# Patient Record
Sex: FEMALE | Race: BLACK OR AFRICAN AMERICAN | NOT HISPANIC OR LATINO | Employment: UNEMPLOYED | URBAN - METROPOLITAN AREA
[De-identification: names, ages, dates, MRNs, and addresses within clinical notes are randomized per-mention and may not be internally consistent; named-entity substitution may affect disease eponyms.]

---

## 2022-09-04 ENCOUNTER — HOSPITAL ENCOUNTER (EMERGENCY)
Facility: HOSPITAL | Age: 22
Discharge: HOME | End: 2022-09-04
Attending: EMERGENCY MEDICINE

## 2022-09-04 ENCOUNTER — APPOINTMENT (OUTPATIENT)
Dept: RADIOLOGY | Facility: HOSPITAL | Age: 22
End: 2022-09-04

## 2022-09-04 VITALS
SYSTOLIC BLOOD PRESSURE: 122 MMHG | HEART RATE: 86 BPM | WEIGHT: 124.12 LBS | OXYGEN SATURATION: 100 % | DIASTOLIC BLOOD PRESSURE: 69 MMHG | TEMPERATURE: 98.8 F | RESPIRATION RATE: 16 BRPM

## 2022-09-04 DIAGNOSIS — R07.89 MUSCULOSKELETAL CHEST PAIN: Primary | ICD-10-CM

## 2022-09-04 DIAGNOSIS — M94.0 COSTOCHONDRITIS: ICD-10-CM

## 2022-09-04 LAB
ATRIAL RATE: 80 BPM
EXT PREG TEST URINE: NEGATIVE
EXT. CONTROL ED NAV: NORMAL
P AXIS: 72 DEGREES
PR INTERVAL: 164 MS
QRS AXIS: 71 DEGREES
QRSD INTERVAL: 98 MS
QT INTERVAL: 376 MS
QTC INTERVAL: 433 MS
T WAVE AXIS: 65 DEGREES
VENTRICULAR RATE: 80 BPM

## 2022-09-04 PROCEDURE — 81025 URINE PREGNANCY TEST: CPT | Performed by: EMERGENCY MEDICINE

## 2022-09-04 PROCEDURE — 71046 X-RAY EXAM CHEST 2 VIEWS: CPT

## 2022-09-04 PROCEDURE — 93005 ELECTROCARDIOGRAM TRACING: CPT

## 2022-09-04 PROCEDURE — 99284 EMERGENCY DEPT VISIT MOD MDM: CPT

## 2022-09-04 PROCEDURE — 93010 ELECTROCARDIOGRAM REPORT: CPT

## 2022-09-04 PROCEDURE — 99284 EMERGENCY DEPT VISIT MOD MDM: CPT | Performed by: EMERGENCY MEDICINE

## 2022-09-04 RX ORDER — ACETAMINOPHEN 325 MG/1
650 TABLET ORAL ONCE
Status: COMPLETED | OUTPATIENT
Start: 2022-09-04 | End: 2022-09-04

## 2022-09-04 RX ORDER — NAPROXEN 500 MG/1
500 TABLET ORAL 2 TIMES DAILY WITH MEALS
Qty: 30 TABLET | Refills: 0 | Status: SHIPPED | OUTPATIENT
Start: 2022-09-04 | End: 2022-10-27

## 2022-09-04 RX ADMIN — ACETAMINOPHEN 650 MG: 325 TABLET, FILM COATED ORAL at 21:02

## 2022-09-04 NOTE — Clinical Note
Leesa Quinonez was seen and treated in our emergency department on 9/4/2022  Diagnosis:     Jeannie Holley  may return to work on return date  She may return on this date: 09/05/2022    Limited lifting until pain improves     If you have any questions or concerns, please don't hesitate to call        Iwona Anand MD    ______________________________           _______________          _______________  Hospital Representative                              Date                                Time

## 2022-09-05 NOTE — ED PROVIDER NOTES
History  Chief Complaint   Patient presents with    Muscle Pain     Chest muscle pain r/t heavy lifting, took aleve for the same  63-year-old healthy female presenting for evaluation of chest pain  Patient states she was lifting heavy boxes at work and felt a pop in her midsternal region  Since that time, patient has had a constant sharp pain that is worse with movement and deep breathing  Patient took Aleve at home without significant improvement  Patient denies nausea, vomiting, dyspnea, diaphoresis, fever, chills, recent upper respiratory infection  Denies personal or family history of blood clot, leg swelling, recent travel, surgery, immobility, or exogenous estrogen use, hemoptysis  None       History reviewed  No pertinent past medical history  History reviewed  No pertinent surgical history  History reviewed  No pertinent family history  I have reviewed and agree with the history as documented  E-Cigarette/Vaping    E-Cigarette Use Never User      E-Cigarette/Vaping Substances     Social History     Tobacco Use    Smoking status: Never Smoker    Smokeless tobacco: Never Used   Vaping Use    Vaping Use: Never used   Substance Use Topics    Alcohol use: Never    Drug use: Never       Review of Systems   Constitutional: Negative for chills and fever  HENT: Negative for congestion, rhinorrhea and sore throat  Eyes: Negative for pain, discharge and visual disturbance  Respiratory: Negative for cough and shortness of breath  Cardiovascular: Positive for chest pain  Negative for palpitations and leg swelling  Gastrointestinal: Negative for abdominal pain, diarrhea, nausea and vomiting  Genitourinary: Negative for dysuria, frequency and hematuria  Musculoskeletal: Negative for arthralgias and myalgias  Skin: Negative for color change and rash  Neurological: Negative for dizziness, weakness, light-headedness, numbness and headaches     Hematological: Does not bruise/bleed easily  Physical Exam  Physical Exam  Vitals and nursing note reviewed  Constitutional:       General: She is not in acute distress  Appearance: Normal appearance  HENT:      Head: Normocephalic and atraumatic  Nose: Nose normal       Mouth/Throat:      Mouth: Mucous membranes are moist    Eyes:      General: No scleral icterus  Extraocular Movements: Extraocular movements intact  Conjunctiva/sclera: Conjunctivae normal       Pupils: Pupils are equal, round, and reactive to light  Cardiovascular:      Rate and Rhythm: Normal rate and regular rhythm  Pulmonary:      Effort: Pulmonary effort is normal  No respiratory distress  Breath sounds: No wheezing, rhonchi or rales  Chest:      Chest wall: Tenderness (Midsternal to palpation) present  Abdominal:      General: There is no distension  Palpations: Abdomen is soft  Tenderness: There is no abdominal tenderness  There is no guarding or rebound  Musculoskeletal:         General: No deformity  Cervical back: Neck supple  Skin:     General: Skin is warm and dry  Findings: No rash  Neurological:      General: No focal deficit present  Mental Status: She is alert and oriented to person, place, and time  Mental status is at baseline        Gait: Gait normal    Psychiatric:         Mood and Affect: Mood normal          Behavior: Behavior normal          Vital Signs  ED Triage Vitals   Temperature Pulse Respirations Blood Pressure SpO2   09/04/22 2028 09/04/22 2028 09/04/22 2028 09/04/22 2028 09/04/22 2028   98 8 °F (37 1 °C) 86 16 122/69 100 %      Temp Source Heart Rate Source Patient Position - Orthostatic VS BP Location FiO2 (%)   09/04/22 2028 09/04/22 2028 09/04/22 2028 09/04/22 2028 --   Tympanic Monitor Sitting Left arm       Pain Score       09/04/22 2102       10 - Worst Possible Pain           Vitals:    09/04/22 2028   BP: 122/69   Pulse: 86   Patient Position - Orthostatic VS: Sitting         Visual Acuity      ED Medications  Medications   acetaminophen (TYLENOL) tablet 650 mg (650 mg Oral Given 9/4/22 2102)       Diagnostic Studies  Results Reviewed     Procedure Component Value Units Date/Time    POCT pregnancy, urine [[id number]]  (Normal) Resulted: 09/04/22 2101    Lab Status: Final result Updated: 09/04/22 2102     EXT PREG TEST UR (Ref: Negative) negative     Control valid    POCT pregnancy, urine [[id number]]     Lab Status: No result                  XR chest 2 views   ED Interpretation by Rg Riley MD (09/04 2118)   No acute disease                 Procedures  ECG 12 Lead Documentation Only    Date/Time: 9/4/2022 11:15 PM  Performed by: Rg Riley MD  Authorized by: Rg Riley MD     Indications / Diagnosis:  Chest pain  ECG reviewed by me, the ED Provider: yes    Patient location:  ED  Previous ECG:     Previous ECG:  Unavailable  Interpretation:     Interpretation: normal    Rate:     ECG rate:  80    ECG rate assessment: normal    Rhythm:     Rhythm: sinus rhythm    Ectopy:     Ectopy: none    QRS:     QRS axis:  Normal    QRS intervals:  Normal  Conduction:     Conduction: abnormal      Abnormal conduction: non-specific intraventricular conduction delay    ST segments:     ST segments:  Normal  T waves:     T waves: normal    Comments:      No comparison; possible right ventricular conduction delay             ED Course  ED Course as of 09/04/22 2317   Sun Sep 04, 2022   2102 PREGNANCY TEST URINE: negative                       PERC Rule for PE    Flowsheet Row Most Recent Value   PERC Rule for PE    Age >=50 0 Filed at: 09/04/2022 2044   HR >=100 0 Filed at: 09/04/2022 2044   O2 Sat on room air < 95% 0 Filed at: 09/04/2022 2044   History of PE or DVT 0 Filed at: 09/04/2022 2044   Recent trauma or surgery 0 Filed at: 09/04/2022 2044   Hemoptysis 0 Filed at: 09/04/2022 2044   Exogenous estrogen 0 Filed at: 09/04/2022 2044 Unilateral leg swelling 0 Filed at: 09/04/2022 2044   PERC Rule for PE Results 0 Filed at: 09/04/2022 2044                            [facility]  Number of Diagnoses or Management Options  Costochondritis  Musculoskeletal chest pain  Diagnosis management comments: 79-year-old female presenting for evaluation of chest pain after lifting heavy object at work today  Pain is reproducible without crepitus  EKG shows normal sinus rhythm without evidence of ischemia or malignant dysrhythmia  Chest x-ray per my interpretation is negative for acute pathology  Patient is PERC negative; low suspicion for pulmonary embolism as a cause of her symptoms  Given her history of physical examination, I suspect musculoskeletal etiology of possible costochondritis  Patient given acetaminophen here and counseled on supportive measures at home  Will provide prescription for naproxen  Provided with limited return to work ability until pain is improved  Will follow-up with PCP as needed  Return precautions discussed  Patient is in agreement and understanding of these instructions  Disposition  Final diagnoses:   Musculoskeletal chest pain   Costochondritis     Time reflects when diagnosis was documented in both MDM as applicable and the Disposition within this note     Time User Action Codes Description Comment    9/4/2022  9:19 PM Zack Sarmiento Add [R07 89] Musculoskeletal chest pain     9/4/2022  9:19 PM Zack Sarmiento Add [M94 0] Costochondritis       ED Disposition     ED Disposition   Discharge    Condition   Stable    Date/Time   Sun Sep 4, 2022  9:19 PM    Comment   Jose Eye discharge to home/self care                 Follow-up Information     Follow up With Specialties Details Why [address] [facility]  As needed [address] [place] [address]  [phone]            Discharge Medication List as of 9/4/2022  9:21 PM      START taking these medications    Details   naproxen (Naprosyn) 500 mg tablet Take 1 tablet (500 mg total) by mouth 2 (two) times a day with meals, Starting Sun 9/4/2022, Print             No discharge procedures on file      PDMP Review     None          ED Provider  Electronically Signed by           Eugene Breaux MD  09/04/22 8811

## 2022-10-27 ENCOUNTER — HOSPITAL ENCOUNTER (EMERGENCY)
Facility: HOSPITAL | Age: 22
Discharge: HOME | End: 2022-10-27
Attending: EMERGENCY MEDICINE

## 2022-10-27 ENCOUNTER — APPOINTMENT (EMERGENCY)
Dept: RADIOLOGY | Facility: HOSPITAL | Age: 22
End: 2022-10-27

## 2022-10-27 VITALS
DIASTOLIC BLOOD PRESSURE: 87 MMHG | SYSTOLIC BLOOD PRESSURE: 133 MMHG | RESPIRATION RATE: 18 BRPM | OXYGEN SATURATION: 99 % | BODY MASS INDEX: 18.99 KG/M2 | WEIGHT: 121 LBS | TEMPERATURE: 98.4 F | HEART RATE: 77 BPM | HEIGHT: 67 IN

## 2022-10-27 DIAGNOSIS — S93.402A SPRAIN OF LEFT ANKLE, UNSPECIFIED LIGAMENT, INITIAL ENCOUNTER: Primary | ICD-10-CM

## 2022-10-27 PROCEDURE — 73610 X-RAY EXAM OF ANKLE: CPT

## 2022-10-27 RX ORDER — KETOROLAC TROMETHAMINE 30 MG/ML
15 INJECTION, SOLUTION INTRAMUSCULAR; INTRAVENOUS ONCE
Status: COMPLETED | OUTPATIENT
Start: 2022-10-27 | End: 2022-10-27

## 2022-10-27 RX ADMIN — KETOROLAC TROMETHAMINE 15 MG: 30 INJECTION, SOLUTION INTRAMUSCULAR; INTRAVENOUS at 18:17

## 2022-10-27 NOTE — ED PROVIDER NOTES
History  Chief Complaint   Patient presents with   • Ankle Pain     Left ankle injury 1 month ago, past two days left ankle pain and swelling  No meds  HPI  Patient is a 82-year-old female with no significant past medical history presenting with left ankle injury  Patient reports that 1 month ago was walking down a ladder when she slipped causing an internal rotation type injury to her left ankle  Reports Munoz pain for several days following that but was quickly back to her usual self air with no residual pain  Two days ago she again had a similar inversion type injury to the left ankle and has noticed increasing pain and swelling to the lateral aspect of the ankle since then  He denies any numbness or weakness  Denies any other injuries no hit to head denies loss consciousness  Has not been taking anything for pain  None       History reviewed  No pertinent past medical history  History reviewed  No pertinent surgical history  History reviewed  No pertinent family history  I have reviewed and agree with the history as documented  E-Cigarette/Vaping   • E-Cigarette Use Never User      E-Cigarette/Vaping Substances     Social History     Tobacco Use   • Smoking status: Never Smoker   • Smokeless tobacco: Never Used   Vaping Use   • Vaping Use: Never used   Substance Use Topics   • Alcohol use: Never   • Drug use: Never       Review of Systems   Constitutional: Negative for chills and fever  HENT: Negative for congestion and sore throat  Eyes: Negative for redness and visual disturbance  Respiratory: Negative for cough and shortness of breath  Cardiovascular: Negative for chest pain and palpitations  Gastrointestinal: Negative for constipation, diarrhea, nausea and vomiting  Genitourinary: Negative for dysuria, hematuria, vaginal bleeding and vaginal discharge  Musculoskeletal: Positive for arthralgias (L ankle)  Negative for myalgias  Skin: Negative for rash and wound  Allergic/Immunologic: Negative for immunocompromised state  Neurological: Negative for seizures and syncope  Psychiatric/Behavioral: Negative for confusion, self-injury and suicidal ideas  Physical Exam  Physical Exam  Vitals and nursing note reviewed  Constitutional:       General: She is not in acute distress  Appearance: Normal appearance  She is well-developed  She is not ill-appearing  HENT:      Head: Normocephalic and atraumatic  No raccoon eyes  Right Ear: External ear normal       Left Ear: External ear normal       Nose: Nose normal  No congestion  Mouth/Throat:      Lips: Pink  Mouth: Mucous membranes are moist    Eyes:      General: Lids are normal  No scleral icterus  Conjunctiva/sclera: Conjunctivae normal    Cardiovascular:      Rate and Rhythm: Normal rate and regular rhythm  Heart sounds: No murmur heard  No friction rub  Pulmonary:      Effort: Pulmonary effort is normal  No respiratory distress  Breath sounds: No wheezing or rales  Abdominal:      General: Abdomen is flat  Tenderness: There is no abdominal tenderness  There is no guarding or rebound  Musculoskeletal:         General: No swelling or signs of injury  Cervical back: Normal range of motion  No rigidity or tenderness  Comments: Slight left ankle ecchymosis and edema over the lateral malleolus with tenderness palpation along the lateral malleolus into the base of the 5th metatarsal   No medial findings sensation intact DP is 2+ bilaterally  Able to ambulate  Skin:     General: Skin is warm and dry  Coloration: Skin is not jaundiced  Findings: No rash  Neurological:      Mental Status: She is alert and oriented to person, place, and time  Mental status is at baseline  Psychiatric:         Behavior: Behavior normal  Behavior is cooperative           Vital Signs  ED Triage Vitals [10/27/22 1735]   Temperature Pulse Respirations Blood Pressure SpO2 98 2 °F (36 8 °C) 91 17 122/71 99 %      Temp Source Heart Rate Source Patient Position - Orthostatic VS BP Location FiO2 (%)   Oral Monitor Sitting Left arm --      Pain Score       7           Vitals:    10/27/22 1735   BP: 122/71   Pulse: 91   Patient Position - Orthostatic VS: Sitting         Visual Acuity      ED Medications  Medications   ketorolac (TORADOL) injection 15 mg (has no administration in time range)       Diagnostic Studies  Results Reviewed     None                 XR ankle 3+ views LEFT    (Results Pending)              Procedures  Procedures         ED Course  ED Course as of 10/27/22 2008   Thu Oct 27, 2022   Joanne 1976 Imaging review done by myself and preliminary results were discussed with the patient and family members  Whitney Longoria was had with patient and family members that this read is preliminary and therefore discrepancies between this read and the final read by the radiologist are possible   Patient and family members were informed that any discrepancies found by would be relayed by phone call          imaging reassuring  Placed in an Aircast and given advised for supportive care including RI CE therapy  will discharge in stable condition will follow-up with PCP usual extremity injury return precautions discussed  MDM  Patient on arrival is ambulatory to room is in no acute distress, vital signs stable, afebrile  On exam lungs clear auscultation, heart without murmurs rubs or gallops abdomen soft nontender  Lateral malleolus tenderness on exam   Will obtain imaging to further assess will give Toradol for pain  Disposition  Final diagnoses:   None     ED Disposition     None      Follow-up Information    None         Patient's Medications   Discharge Prescriptions    No medications on file       No discharge procedures on file      PDMP Review     None          ED Provider  Electronically Signed by           Av Mcgovern MD  10/27/22 2008

## 2022-10-27 NOTE — DISCHARGE INSTRUCTIONS
You have been evaluated in the Emergency Department today for L ankle pain  Your evaluation did not find evidence of medical conditions requiring emergent intervention at this time  Please rest, ice, and elevate your L ankle, and resume normal activities as tolerated  We recommend you take 600mg ibuprofen every 6 hours or tylenol 650mg every 6 hours as needed for pain  If needed, you can alternate these medications so that you take one medication every 3 hours  For instance, at noon take ibuprofen, then at 3pm take tylenol, then at 6pm take ibuprofen  Please schedule an appointment for follow up with your primary care physician this week  Return to the Emergency Department if you experience worsening pain, numbness, tingling, change of color in your toes, or any other concerning symptoms  Thank you for choosing us for your care

## 2023-10-15 ENCOUNTER — APPOINTMENT (EMERGENCY)
Dept: CT IMAGING | Facility: HOSPITAL | Age: 23
End: 2023-10-15
Payer: COMMERCIAL

## 2023-10-15 ENCOUNTER — HOSPITAL ENCOUNTER (EMERGENCY)
Facility: HOSPITAL | Age: 23
Discharge: HOME | End: 2023-10-15
Attending: EMERGENCY MEDICINE
Payer: COMMERCIAL

## 2023-10-15 VITALS
OXYGEN SATURATION: 100 % | HEIGHT: 67 IN | WEIGHT: 125 LBS | BODY MASS INDEX: 19.62 KG/M2 | HEART RATE: 93 BPM | SYSTOLIC BLOOD PRESSURE: 138 MMHG | DIASTOLIC BLOOD PRESSURE: 77 MMHG | TEMPERATURE: 98.8 F | RESPIRATION RATE: 19 BRPM

## 2023-10-15 DIAGNOSIS — S09.90XA INJURY OF HEAD, INITIAL ENCOUNTER: ICD-10-CM

## 2023-10-15 DIAGNOSIS — R55 SYNCOPE: Primary | ICD-10-CM

## 2023-10-15 DIAGNOSIS — D50.9 IRON DEFICIENCY ANEMIA: ICD-10-CM

## 2023-10-15 DIAGNOSIS — R55 VASOVAGAL SYNCOPE: ICD-10-CM

## 2023-10-15 LAB
ANION GAP SERPL CALCULATED.3IONS-SCNC: 6 MMOL/L
BASOPHILS # BLD AUTO: 0.01 THOUSANDS/ÂΜL (ref 0–0.1)
BASOPHILS NFR BLD AUTO: 0 % (ref 0–1)
BUN SERPL-MCNC: 11 MG/DL (ref 5–25)
CALCIUM SERPL-MCNC: 9.4 MG/DL (ref 8.4–10.2)
CHLORIDE SERPL-SCNC: 104 MMOL/L (ref 96–108)
CO2 SERPL-SCNC: 27 MMOL/L (ref 21–32)
CREAT SERPL-MCNC: 0.71 MG/DL (ref 0.6–1.3)
EOSINOPHIL # BLD AUTO: 0.02 THOUSAND/ÂΜL (ref 0–0.61)
EOSINOPHIL NFR BLD AUTO: 0 % (ref 0–6)
ERYTHROCYTE [DISTWIDTH] IN BLOOD BY AUTOMATED COUNT: 15.5 % (ref 11.6–15.1)
GFR SERPL CREATININE-BSD FRML MDRD: 122 ML/MIN/1.73SQ M
GLUCOSE SERPL-MCNC: 90 MG/DL (ref 65–140)
HCG SERPL QL: NEGATIVE
HCT VFR BLD AUTO: 32.7 % (ref 34.8–46.1)
HGB BLD-MCNC: 10.3 G/DL (ref 11.5–15.4)
IMM GRANULOCYTES # BLD AUTO: 0 THOUSAND/UL (ref 0–0.2)
IMM GRANULOCYTES NFR BLD AUTO: 0 % (ref 0–2)
LYMPHOCYTES # BLD AUTO: 1.39 THOUSANDS/ÂΜL (ref 0.6–4.47)
LYMPHOCYTES NFR BLD AUTO: 25 % (ref 14–44)
MAGNESIUM SERPL-MCNC: 2 MG/DL (ref 1.9–2.7)
MCH RBC QN AUTO: 24.9 PG (ref 26.8–34.3)
MCHC RBC AUTO-ENTMCNC: 31.5 G/DL (ref 31.4–37.4)
MCV RBC AUTO: 79 FL (ref 82–98)
MONOCYTES # BLD AUTO: 0.61 THOUSAND/ÂΜL (ref 0.17–1.22)
MONOCYTES NFR BLD AUTO: 11 % (ref 4–12)
NEUTROPHILS # BLD AUTO: 3.59 THOUSANDS/ÂΜL (ref 1.85–7.62)
NEUTS SEG NFR BLD AUTO: 64 % (ref 43–75)
NRBC BLD AUTO-RTO: 0 /100 WBCS
PLATELET # BLD AUTO: 330 THOUSANDS/UL (ref 149–390)
PMV BLD AUTO: 11.4 FL (ref 8.9–12.7)
POTASSIUM SERPL-SCNC: 3.6 MMOL/L (ref 3.5–5.3)
RBC # BLD AUTO: 4.14 MILLION/UL (ref 3.81–5.12)
SODIUM SERPL-SCNC: 137 MMOL/L (ref 135–147)
WBC # BLD AUTO: 5.62 THOUSAND/UL (ref 4.31–10.16)

## 2023-10-15 PROCEDURE — G1004 CDSM NDSC: HCPCS

## 2023-10-15 PROCEDURE — 70450 CT HEAD/BRAIN W/O DYE: CPT

## 2023-10-15 PROCEDURE — 83735 ASSAY OF MAGNESIUM: CPT | Performed by: EMERGENCY MEDICINE

## 2023-10-15 PROCEDURE — 93005 ELECTROCARDIOGRAM TRACING: CPT

## 2023-10-15 PROCEDURE — 36415 COLL VENOUS BLD VENIPUNCTURE: CPT | Performed by: EMERGENCY MEDICINE

## 2023-10-15 PROCEDURE — 84703 CHORIONIC GONADOTROPIN ASSAY: CPT | Performed by: EMERGENCY MEDICINE

## 2023-10-15 PROCEDURE — 85025 COMPLETE CBC W/AUTO DIFF WBC: CPT | Performed by: EMERGENCY MEDICINE

## 2023-10-15 PROCEDURE — 80048 BASIC METABOLIC PNL TOTAL CA: CPT | Performed by: EMERGENCY MEDICINE

## 2023-10-15 RX ORDER — FERROUS SULFATE 325(65) MG
325 TABLET ORAL DAILY
Qty: 30 TABLET | Refills: 0 | Status: SHIPPED | OUTPATIENT
Start: 2023-10-15

## 2023-10-15 RX ORDER — ACETAMINOPHEN 325 MG/1
650 TABLET ORAL ONCE
Status: COMPLETED | OUTPATIENT
Start: 2023-10-15 | End: 2023-10-15

## 2023-10-15 RX ORDER — FERROUS GLUCONATE 324(38)MG
324 TABLET ORAL ONCE
Status: COMPLETED | OUTPATIENT
Start: 2023-10-15 | End: 2023-10-15

## 2023-10-15 RX ORDER — KETOROLAC TROMETHAMINE 30 MG/ML
30 INJECTION, SOLUTION INTRAMUSCULAR; INTRAVENOUS ONCE
Status: COMPLETED | OUTPATIENT
Start: 2023-10-15 | End: 2023-10-15

## 2023-10-15 RX ADMIN — KETOROLAC TROMETHAMINE 30 MG: 30 INJECTION, SOLUTION INTRAMUSCULAR; INTRAVENOUS at 21:40

## 2023-10-15 RX ADMIN — FERROUS GLUCONATE 324 MG: 324 TABLET ORAL at 21:35

## 2023-10-15 RX ADMIN — DEXAMETHASONE SODIUM PHOSPHATE 10 MG: 10 INJECTION, SOLUTION INTRAMUSCULAR; INTRAVENOUS at 21:35

## 2023-10-15 RX ADMIN — ACETAMINOPHEN 325MG 650 MG: 325 TABLET ORAL at 18:55

## 2023-10-15 NOTE — Clinical Note
Lamont Cruz was seen and treated in our emergency department on 10/15/2023. Diagnosis:     Mer Diaz  may return to work on return date. She may return on this date: 10/17/2023         If you have any questions or concerns, please don't hesitate to call.       Josefa Dave, DO    ______________________________           _______________          _______________  Hospital Representative                              Date                                Time

## 2023-10-15 NOTE — ED PROVIDER NOTES
History  Chief Complaint   Patient presents with    Syncope     Patient arrives via EMS from work after having a syncopal episode. Patient reports striking left side of head on floor. Patient states she hasn't eaten since yesterday "because of work"     Patient is a healthy 27-year-old female coming in today via EMS. According to patient, her last time she ate was yesterday because she "does not eat until late at night. She woke up this morning and felt well. She went into work and did not eat breakfast.  She had no associated fevers, chills, chest pain, palpitations or lower extremity edema. 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 She started not feeling right where "it just got hot and things started to go black and then I passed out". Patient currently has a mild headache and some abdominal cramping consistent with her period cramps in the past.  She has a heavier period than normal.  There is no nausea, vomiting, diarrhea. No hematemesis or coffee-ground emesis. Denies any melena or bright red blood per rectum. She has no abdominal pain at this time. She has not eaten yet. History provided by:  Patient, medical records and EMS personnel   used: No    Syncope  Episode history:  Single  Most recent episode: Today  Duration: unknown.   Progression:  Resolved  Chronicity:  New  Context: not blood draw, not bowel movement, not dehydration, not exertion, not inactivity, not medication change, not with normal activity, not sight of blood, not sitting down, not standing up and not urination    Witnessed: yes    Relieved by:  None tried  Worsened by:  Nothing  Ineffective treatments:  None tried  Associated symptoms: anxiety and dizziness    Associated symptoms: no chest pain, no confusion, no diaphoresis, no difficulty breathing, no fever, no focal sensory loss, no focal weakness, no headaches, no malaise/fatigue, no nausea, no palpitations, no recent fall, no recent injury, no recent surgery, no rectal bleeding, no seizures, no shortness of breath, no visual change, no vomiting and no weakness    Risk factors: no congenital heart disease, no coronary artery disease, no seizures and no vascular disease        None       History reviewed. No pertinent past medical history. History reviewed. No pertinent surgical history. History reviewed. No pertinent family history. I have reviewed and agree with the history as documented. E-Cigarette/Vaping    E-Cigarette Use Never User      E-Cigarette/Vaping Substances     Social History     Tobacco Use    Smoking status: Never    Smokeless tobacco: Never   Vaping Use    Vaping Use: Never used   Substance Use Topics    Alcohol use: Never    Drug use: Never       Review of Systems   Constitutional: Negative. Negative for chills, diaphoresis, fever and malaise/fatigue. HENT: Negative. Negative for ear pain and sore throat. Eyes:  Negative for pain and visual disturbance. Respiratory: Negative. Negative for cough and shortness of breath. Cardiovascular:  Positive for syncope. Negative for chest pain and palpitations. Gastrointestinal: Negative. Negative for abdominal pain, nausea and vomiting. Genitourinary: Negative. Negative for dysuria and hematuria. Musculoskeletal: Negative. Negative for arthralgias and back pain. Skin: Negative. Negative for color change and rash. Neurological:  Positive for dizziness. Negative for focal weakness, seizures, syncope, weakness and headaches. Hematological: Negative. Psychiatric/Behavioral: Negative. Negative for confusion. All other systems reviewed and are negative. Physical Exam  Physical Exam  Vitals and nursing note reviewed. Constitutional:       General: She is not in acute distress. Appearance: She is well-developed. Comments: Soft spoken     HENT:      Head: Normocephalic and atraumatic.       Comments: Patient maintaining airway and secretions. No stridor . No brawniness under tongue. Nose: Nose normal.      Mouth/Throat:      Mouth: Mucous membranes are dry. Eyes:      Extraocular Movements: Extraocular movements intact. Conjunctiva/sclera: Conjunctivae normal.      Pupils: Pupils are equal, round, and reactive to light. Cardiovascular:      Rate and Rhythm: Normal rate and regular rhythm. Pulses:           Radial pulses are 2+ on the right side and 2+ on the left side. Dorsalis pedis pulses are 2+ on the right side and 2+ on the left side. Heart sounds: Normal heart sounds, S1 normal and S2 normal. No murmur heard. Pulmonary:      Effort: Pulmonary effort is normal. No respiratory distress. Breath sounds: Normal breath sounds. Abdominal:      General: Abdomen is flat. Bowel sounds are normal.      Palpations: Abdomen is soft. Tenderness: There is no abdominal tenderness. Musculoskeletal:         General: No swelling. Cervical back: Neck supple. Right lower leg: No edema. Left lower leg: No edema. Skin:     General: Skin is warm and dry. Capillary Refill: Capillary refill takes less than 2 seconds. Neurological:      General: No focal deficit present. Mental Status: She is alert and oriented to person, place, and time. GCS: GCS eye subscore is 4. GCS verbal subscore is 5. GCS motor subscore is 6. Cranial Nerves: Cranial nerves 2-12 are intact. Sensory: Sensation is intact. Motor: Motor function is intact. Coordination: Coordination is intact. Comments: Cranial nerves 2-12 grossly intact. EOMI. PERRLA. No slurred speech. No facial asymmetry. No tongue deviation. Negative pronator drift.   No nystagmus    Patient can move bilateral upper extremities and lower extremities independently of each other pain-free with full active range of motion throughout the bilateral shoulders, elbows, wrists, hips, knees and ankles. Manual muscle grade 5/5 throughout the bilateral upper extremities and lower extremities. Psychiatric:         Mood and Affect: Mood normal.         Behavior: Behavior normal.         Thought Content:  Thought content normal.         Judgment: Judgment normal.         Vital Signs  ED Triage Vitals   Temperature Pulse Respirations Blood Pressure SpO2   10/15/23 1842 10/15/23 1842 10/15/23 1842 10/15/23 1842 10/15/23 1842   98.8 °F (37.1 °C) 81 18 120/57 100 %      Temp Source Heart Rate Source Patient Position - Orthostatic VS BP Location FiO2 (%)   10/15/23 1842 10/15/23 1842 10/15/23 1842 10/15/23 1842 --   Oral Monitor Sitting Left arm       Pain Score       10/15/23 1855       10 - Worst Possible Pain           Vitals:    10/15/23 1842 10/15/23 1930   BP: 120/57 138/77   Pulse: 81 93   Patient Position - Orthostatic VS: Sitting          Visual Acuity      ED Medications  Medications   acetaminophen (TYLENOL) tablet 650 mg (650 mg Oral Given 10/15/23 1855)   dexamethasone oral liquid 10 mg 1 mL (10 mg Oral Given 10/15/23 2135)   ferrous gluconate (FERGON) tablet 324 mg (324 mg Oral Given 10/15/23 2135)   ketorolac (TORADOL) injection 30 mg (30 mg Intravenous Given 10/15/23 2140)       Diagnostic Studies  Results Reviewed       Procedure Component Value Units Date/Time    hCG, qualitative pregnancy [[id number]]  (Normal) Collected: 10/15/23 1855    Lab Status: Final result Specimen: Blood from Arm, Left Updated: 10/15/23 1927     Preg, Serum Negative    Basic metabolic panel [[id number]] Collected: 10/15/23 1855    Lab Status: Final result Specimen: Blood from Arm, Left Updated: 10/15/23 1922     Sodium 137 mmol/L      Potassium 3.6 mmol/L      Chloride 104 mmol/L      CO2 27 mmol/L      ANION GAP 6 mmol/L      BUN 11 mg/dL      Creatinine 0.71 mg/dL      Glucose 90 mg/dL      Calcium 9.4 mg/dL      eGFR 122 ml/min/1.73sq m     Narrative:      [facility] (CKD):     Stage 1 with normal or high GFR (GFR > 90 mL/min/1.73 square meters)    Stage 2 Mild CKD (GFR = 60-89 mL/min/1.73 square meters)    Stage 3A Moderate CKD (GFR = 45-59 mL/min/1.73 square meters)    Stage 3B Moderate CKD (GFR = 30-44 mL/min/1.73 square meters)    Stage 4 Severe CKD (GFR = 15-29 mL/min/1.73 square meters)    Stage 5 End Stage CKD (GFR <15 mL/min/1.73 square meters)  Note: GFR calculation is accurate only with a steady state creatinine    Magnesium [[id number]]  (Normal) Collected: 10/15/23 1855    Lab Status: Final result Specimen: Blood from Arm, Left Updated: 10/15/23 1922     Magnesium 2.0 mg/dL     CBC and differential [[id number]]  (Abnormal) Collected: 10/15/23 1855    Lab Status: Final result Specimen: Blood from Arm, Left Updated: 10/15/23 1904     WBC 5.62 Thousand/uL      RBC 4.14 Million/uL      Hemoglobin 10.3 g/dL      Hematocrit 32.7 %      MCV 79 fL      MCH 24.9 pg      MCHC 31.5 g/dL      RDW 15.5 %      MPV 11.4 fL      Platelets 250 Thousands/uL      nRBC 0 /100 WBCs      Neutrophils Relative 64 %      Immat GRANS % 0 %      Lymphocytes Relative 25 %      Monocytes Relative 11 %      Eosinophils Relative 0 %      Basophils Relative 0 %      Neutrophils Absolute 3.59 Thousands/µL      Immature Grans Absolute 0.00 Thousand/uL      Lymphocytes Absolute 1.39 Thousands/µL      Monocytes Absolute 0.61 Thousand/µL      Eosinophils Absolute 0.02 Thousand/µL      Basophils Absolute 0.01 Thousands/µL                    CT head without contrast   Final Result by Ale Dejesus MD (10/15 2133)      No acute intracranial abnormality. Workstation performed: [id number]                    Procedures  Procedures         ED Course  ED Course as of 10/15/23 2142   Bronwyn Dancer Oct 15, 2023   1846 Patient is a 68-year-old female coming in today via EMS after syncopal event. On exam well-appearing in no distress.   Will check basic labs, CT head as patient struck her head.  Patient agreeable    EKG without ischemia or arrhythmia    Disclosure: Voice to text software was used in the preparation of this document and could have resulted in translational errors. Occasional wrong word or "sound a like" substitutions may have occurred due to the inherent limitations of voice recognition software. Read the chart carefully and recognize, using context, where substitutions have occurred. I have independently reviewed external records are available to me to the level of detail possible within the time constraints of my patient care responsibilities in the ED.       1911 CBC and differential(!)  Hemoglobin 10.3 with no old to compare. MCV low as well. Will encourage Iron supplementation   1925 Labs reviewed and without actionable derangement     1929 Labs reviewed and without actionable derangement     2112 Patient updated as well as family at bedside. Patient remains neuro intact no focal deficits. Updated on iron deficiency anemia and will need outpatient referral.  Will give first dose of iron here as well as Decadron while waiting on CT report   2136 CT without acute pathology Will give Toradol as well   2142 Patient and family updated on CT. Resting in bed and feels better after medications. Will observe after medications given of Toradol. Neuro intact no focal deficits and no syncopal events here. Return to ER instructions given and will DC home    Counseling: I had a detailed discussion with the patient and/or guardian regarding: the historical points, exam findings, and any diagnostic results supporting the discharge diagnosis, lab results, radiology results, discharge instructions reviewed with patient and/or family/caregiver and understanding was verbalized. Instructions given to return to the emergency department if symptoms worsen or persist, or if there are any questions or concerns that arise at home.      All imaging and/or lab testing discussed with patient, strict return to ED precautions discussed. Patient recommended to follow up promptly with appropriate outpatient provider. Patient and/or family members verbalizes understanding and agrees with plan. Patient and/or family members were given opportunity to ask questions, all questions were answered at this time. Patient is stable for discharge                                   SBIRT 20yo+      Flowsheet Row Most Recent Value   Initial Alcohol Screen: US AUDIT-C     1. How often do you have a drink containing alcohol? 0 Filed at: 10/15/2023 1843   2. How many drinks containing alcohol do you have on a typical day you are drinking? 0 Filed at: 10/15/2023 1843   3b. FEMALE Any Age, or MALE 65+: How often do you have 4 or more drinks on one occassion? 0 Filed at: 10/15/2023 1843   Audit-C Score 0 Filed at: 10/15/2023 1843   RAY: How many times in the past year have you. .. Used an illegal drug or used a prescription medication for non-medical reasons? Never Filed at: 10/15/2023 1843                      Medical Decision Making  EKG INTERPRETATION @ 1844  RHYTHM: NSR @ 86 bpm  AXIS: Normal axis  INTERVALS: CA interval measured at 162 ms  QRS COMPLEX: QRS measured at 96 ms  ST SEGMENT: Nonspecific ST segment changes. Diffuse artifact  QT INTERVAL: QTc measured at 454 ms   COMPARED WITH PRIOR compared to old EKG on September 4, 2022 no acute change  Interpretation by Vern Lin DO      Differential diagnosis includes but not limited to: Arrhythmia, electrolyte disturbance, vasovagal, obstructive cardiomyopathy, saddle PE, PE, aortic dissection, pneumonia, valvular disorder, depletion, alcohol, toxicologic, seizure, hypertension, psychogenic        Amount and/or Complexity of Data Reviewed  Independent Historian: parent and EMS     Details: Family at bedside  Labs: ordered. Decision-making details documented in ED Course. Details: anemia with low MCV.  no TCP or leukocytosis  Pregnancy negative  No acute kidney injury  No electrolyte dysfunction  Radiology: ordered. Decision-making details documented in ED Course. Details: CT head interpreted by radiologist as no acute pathology  ECG/medicine tests: ordered and independent interpretation performed. Decision-making details documented in ED Course. Details: No ischemia or arrhythmia    Risk  OTC drugs. Prescription drug management. Disposition  Final diagnoses:   Syncope   Iron deficiency anemia   Vasovagal syncope   Injury of head, initial encounter     Time reflects when diagnosis was documented in both MDM as applicable and the Disposition within this note       Time User Action Codes Description Comment    10/15/2023  6:56 PM Bendock, Quincy Ahumada L Add [R55] Syncope     10/15/2023  7:12 PM Bendock, Bárbara L Add [D50.9] Iron deficiency anemia     10/15/2023  9:14 PM Bendock, [place] Ahumada L Add [R55] Vasovagal syncope     10/15/2023  9:36 PM Bendock, Bert Foster Add [S09.90XA] Injury of head, initial encounter           ED Disposition       ED Disposition   Discharge    Condition   Stable    Date/Time   Sun Oct 15, 2023 2113    Comment   Moise Fothergill discharge to home/self care.                    Follow-up Information       Follow up With Specialties Details Why Contact Info Additional [address] [facility] Schedule an appointment as soon as possible for a visit in 1 week  [address] [address]  [address] [facility], [address] [facility], [address] [place] [address], [id number] [phone]            Patient's Medications   Discharge Prescriptions    FERROUS SULFATE 325 (65 FE) MG TABLET    Take 1 tablet (325 mg total) by mouth daily THIS MEDICATION CAN CAUSE YOUR STOOLS TO BECOME BLACK AND CONSTIPATION       Start Date: 10/15/2023End Date: --       Order Dose: 325 mg       Quantity: 30 tablet    Refills: 0 PDMP Review       None            ED Provider  Electronically Signed by             Nela Gonzalez DO  10/15/23 8992

## 2023-10-16 LAB
ATRIAL RATE: 86 BPM
P AXIS: 76 DEGREES
PR INTERVAL: 162 MS
QRS AXIS: 84 DEGREES
QRSD INTERVAL: 96 MS
QT INTERVAL: 380 MS
QTC INTERVAL: 454 MS
T WAVE AXIS: 68 DEGREES
VENTRICULAR RATE: 86 BPM

## 2023-10-16 PROCEDURE — 93010 ELECTROCARDIOGRAM REPORT: CPT | Performed by: INTERNAL MEDICINE

## 2024-10-10 ENCOUNTER — HOSPITAL ENCOUNTER (EMERGENCY)
Facility: HOSPITAL | Age: 24
Discharge: HOME | End: 2024-10-10
Attending: EMERGENCY MEDICINE
Payer: COMMERCIAL

## 2024-10-10 VITALS
HEART RATE: 86 BPM | BODY MASS INDEX: 19.58 KG/M2 | RESPIRATION RATE: 18 BRPM | DIASTOLIC BLOOD PRESSURE: 64 MMHG | WEIGHT: 125 LBS | OXYGEN SATURATION: 98 % | SYSTOLIC BLOOD PRESSURE: 133 MMHG

## 2024-10-10 DIAGNOSIS — H10.11 ALLERGIC CONJUNCTIVITIS OF RIGHT EYE: Primary | ICD-10-CM

## 2024-10-10 PROCEDURE — 99284 EMERGENCY DEPT VISIT MOD MDM: CPT | Performed by: PHYSICIAN ASSISTANT

## 2024-10-10 PROCEDURE — 99283 EMERGENCY DEPT VISIT LOW MDM: CPT

## 2024-10-10 RX ORDER — OLOPATADINE HYDROCHLORIDE 1 MG/ML
1 SOLUTION/ DROPS OPHTHALMIC 2 TIMES DAILY
Qty: 5 ML | Refills: 0 | Status: SHIPPED | OUTPATIENT
Start: 2024-10-10

## 2024-10-10 NOTE — Clinical Note
Blanka Salvador was seen and treated in our emergency department on 10/10/2024.                Diagnosis:     Blanka  may return to school on return date.    She may return on this date: 10/11/2024         If you have any questions or concerns, please don't hesitate to call.      Cornelia Kohler PA-C    ______________________________           _______________          _______________  Hospital Representative                              Date                                Time

## 2024-10-10 NOTE — ED PROVIDER NOTES
"Time reflects when diagnosis was documented in both MDM as applicable and the Disposition within this note       Time User Action Codes Description Comment    10/10/2024 11:03 AM Cornelia Kohler Add [H10.11] Allergic conjunctivitis of right eye           ED Disposition       ED Disposition   Discharge    Condition   Good    Date/Time   Thu Oct 10, 2024 11:03 AM    Comment   Blanka Salvador discharge to home/self care.                   Assessment & Plan       Medical Decision Making  24-year-old female presenting for evaluation of right sided eye swelling itching which spontaneously resolved upon evaluation in the ER, she does show photo on her phone which demonstrates surrounding periorbital swelling without erythema, she reports itching which has improved however continues.  She denies any new allergens to her knowledge and denies any other systemic allergic reaction response.  Physical exam is reassuring without any evidence for conjunctivitis or periorbital erythema/swelling/EOM pain to suggest periorbital cellulitis vital signs are stable within normal limits there is no indication for blood work or imaging at this time, ophthalmic antihistamine eyedrops prescribed for use at home, patient advised to follow-up with family care provider.    Strict return to ED precautions discussed. Patient recommended to follow up promptly with appropriate outpatient provider.Patient and/or family members verbalizes understanding and agrees with plan. Patient is stable for discharge.     Portions of the record may have been created with voice recognition software. Occasional wrong word or \"sound a like\" substitutions may have occurred due to the inherent limitations of voice recognition software. Read the chart carefully and recognize, using context, where substitutions have occurred.                 Medications - No data to display    ED Risk Strat Scores                                               History of Present " "Illness       Chief Complaint   Patient presents with    Eye Problem     Swelling noted this am with scant drainage and \"crust\"       History reviewed. No pertinent past medical history.   History reviewed. No pertinent surgical history.   History reviewed. No pertinent family history.   Social History     Tobacco Use    Smoking status: Never    Smokeless tobacco: Never   Vaping Use    Vaping status: Never Used   Substance Use Topics    Alcohol use: Never    Drug use: Never      E-Cigarette/Vaping    E-Cigarette Use Never User       E-Cigarette/Vaping Substances      I have reviewed and agree with the history as documented.     24-year-old female presents today for evaluation of right sided eye swelling and itching earlier this morning she reports no vision changes, discharge, eye pain.  She reports no new allergens to her knowledge.  She denies any other associated symptoms coughing congestion abdominal pain vomiting.  She reports no alleviating factors.  She reports the swelling self resolved.      Eye Problem  Associated symptoms: itching    Associated symptoms: no nausea, no numbness, no redness and no vomiting        Review of Systems   Constitutional:  Negative for chills, fatigue and fever.   HENT:  Negative for congestion, ear pain, rhinorrhea and sore throat.    Eyes:  Positive for itching. Negative for redness.   Respiratory:  Negative for chest tightness and shortness of breath.    Cardiovascular:  Negative for chest pain and palpitations.   Gastrointestinal:  Negative for abdominal pain, nausea and vomiting.   Genitourinary:  Negative for dysuria and hematuria.   Musculoskeletal: Negative.    Skin:  Negative for rash.   Neurological:  Negative for dizziness, syncope, light-headedness and numbness.           Objective       ED Triage Vitals [10/10/24 1048]   Temp Pulse Blood Pressure Respirations SpO2 Patient Position - Orthostatic VS   -- 86 133/64 18 98 % Sitting      Temp src Heart Rate Source BP " Location FiO2 (%) Pain Score    -- -- Left arm -- 1      Vitals      Date and Time Temp Pulse SpO2 Resp BP Pain Score FACES Pain Rating User   10/10/24 1048 -- 86 98 % 18 133/64 1 -- SN            Physical Exam  Vitals and nursing note reviewed.   Constitutional:       Appearance: She is well-developed.   HENT:      Head: Normocephalic.   Eyes:      General: No scleral icterus.     Comments: Pupils are equal and round reactive to light no scleral injection no discharge no periorbital swelling.    Patient does show a photo on her phone which demonstrates periorbital swelling without erythema.  There is no scleral injection or discharge noted on that photo.   Cardiovascular:      Rate and Rhythm: Normal rate and regular rhythm.   Pulmonary:      Effort: Pulmonary effort is normal.      Breath sounds: Normal breath sounds. No stridor.   Abdominal:      General: There is no distension.      Palpations: Abdomen is soft.      Tenderness: There is no abdominal tenderness.   Musculoskeletal:         General: Normal range of motion.   Skin:     General: Skin is warm and dry.      Capillary Refill: Capillary refill takes less than 2 seconds.   Neurological:      Mental Status: She is alert and oriented to person, place, and time.   Psychiatric:         Mood and Affect: Mood and affect normal.         Results Reviewed       None            No orders to display       Procedures    ED Medication and Procedure Management   Prior to Admission Medications   Prescriptions Last Dose Informant Patient Reported? Taking?   ferrous sulfate 325 (65 Fe) mg tablet   No No   Sig: Take 1 tablet (325 mg total) by mouth daily THIS MEDICATION CAN CAUSE YOUR STOOLS TO BECOME BLACK AND CONSTIPATION      Facility-Administered Medications: None     Patient's Medications   Discharge Prescriptions    OLOPATADINE (PATANOL) 0.1 % OPHTHALMIC SOLUTION    Administer 1 drop to the right eye 2 (two) times a day       Start Date: 10/10/2024End Date: --        Order Dose: 1 drop       Quantity: 5 mL    Refills: 0     No discharge procedures on file.  ED SEPSIS DOCUMENTATION   Time reflects when diagnosis was documented in both MDM as applicable and the Disposition within this note       Time User Action Codes Description Comment    10/10/2024 11:03 AM Cornelia Kohler Add [H10.11] Allergic conjunctivitis of right eye                  Cornelia Kohler PA-C  10/10/24 1104

## 2024-10-10 NOTE — Clinical Note
Abena Jones accompanied Blanka Salvador to the emergency department on 10/10/2024.    Return date if applicable: 10/11/2024        If you have any questions or concerns, please don't hesitate to call.      Cornelia Kohler PA-C

## 2024-11-24 ENCOUNTER — APPOINTMENT (EMERGENCY)
Dept: CT IMAGING | Facility: HOSPITAL | Age: 24
End: 2024-11-24
Payer: OTHER MISCELLANEOUS

## 2024-11-24 ENCOUNTER — HOSPITAL ENCOUNTER (EMERGENCY)
Facility: HOSPITAL | Age: 24
Discharge: HOME | End: 2024-11-24
Attending: EMERGENCY MEDICINE
Payer: OTHER MISCELLANEOUS

## 2024-11-24 VITALS
DIASTOLIC BLOOD PRESSURE: 81 MMHG | RESPIRATION RATE: 18 BRPM | OXYGEN SATURATION: 100 % | HEART RATE: 83 BPM | TEMPERATURE: 98.3 F | BODY MASS INDEX: 19.44 KG/M2 | SYSTOLIC BLOOD PRESSURE: 136 MMHG | WEIGHT: 124.12 LBS

## 2024-11-24 DIAGNOSIS — S06.0XAA CONCUSSION: Primary | ICD-10-CM

## 2024-11-24 PROCEDURE — 96372 THER/PROPH/DIAG INJ SC/IM: CPT

## 2024-11-24 PROCEDURE — 70450 CT HEAD/BRAIN W/O DYE: CPT

## 2024-11-24 PROCEDURE — 99283 EMERGENCY DEPT VISIT LOW MDM: CPT

## 2024-11-24 PROCEDURE — 99284 EMERGENCY DEPT VISIT MOD MDM: CPT | Performed by: EMERGENCY MEDICINE

## 2024-11-24 RX ORDER — ONDANSETRON 4 MG/1
4 TABLET, ORALLY DISINTEGRATING ORAL ONCE
Status: COMPLETED | OUTPATIENT
Start: 2024-11-24 | End: 2024-11-24

## 2024-11-24 RX ORDER — KETOROLAC TROMETHAMINE 30 MG/ML
15 INJECTION, SOLUTION INTRAMUSCULAR; INTRAVENOUS ONCE
Status: COMPLETED | OUTPATIENT
Start: 2024-11-24 | End: 2024-11-24

## 2024-11-24 RX ORDER — METOCLOPRAMIDE 10 MG/1
10 TABLET ORAL ONCE
Status: COMPLETED | OUTPATIENT
Start: 2024-11-24 | End: 2024-11-24

## 2024-11-24 RX ORDER — ACETAMINOPHEN 325 MG/1
975 TABLET ORAL ONCE
Status: COMPLETED | OUTPATIENT
Start: 2024-11-24 | End: 2024-11-24

## 2024-11-24 RX ADMIN — KETOROLAC TROMETHAMINE 15 MG: 30 INJECTION, SOLUTION INTRAMUSCULAR; INTRAVENOUS at 18:26

## 2024-11-24 RX ADMIN — ACETAMINOPHEN 975 MG: 325 TABLET, FILM COATED ORAL at 18:25

## 2024-11-24 RX ADMIN — METOCLOPRAMIDE 10 MG: 10 TABLET ORAL at 18:25

## 2024-11-24 RX ADMIN — ONDANSETRON 4 MG: 4 TABLET, ORALLY DISINTEGRATING ORAL at 19:11

## 2024-11-24 NOTE — ED PROVIDER NOTES
Time reflects when diagnosis was documented in both MDM as applicable and the Disposition within this note       Time User Action Codes Description Comment    11/24/2024  9:30 PM Ti Anglin Add [S06.0XAA] Concussion           ED Disposition       ED Disposition   Discharge    Condition   Stable    Date/Time   Sun Nov 24, 2024  9:30 PM    Comment   Blanka Salvador discharge to home/self care.                   Assessment & Plan       Medical Decision Making  24-year-old female presents with head injury related headache as well as photophobia  Patient without any focal neurologic deficits not concerning for intracranial bleed  Also no signs of deformity concerning for skull fracture  Will give analgesia as well as observe patient  No C-spine tenderness not concerning for C-spine fracture  Patient had 1 episode of emesis during observation  CT head was obtained  CT head was negative and patient was discharged with return precautions      Problems Addressed:  Concussion: acute illness or injury    Amount and/or Complexity of Data Reviewed  Radiology: ordered.    Risk  OTC drugs.  Prescription drug management.             Medications   acetaminophen (TYLENOL) tablet 975 mg (975 mg Oral Given 11/24/24 1825)   ketorolac (TORADOL) injection 15 mg (15 mg Intramuscular Given 11/24/24 1826)   metoclopramide (REGLAN) tablet 10 mg (10 mg Oral Given 11/24/24 1825)   ondansetron (ZOFRAN-ODT) dispersible tablet 4 mg (4 mg Oral Given 11/24/24 1911)       ED Risk Strat Scores                           SBIRT 22yo+      Flowsheet Row Most Recent Value   Initial Alcohol Screen: US AUDIT-C     1. How often do you have a drink containing alcohol? 0 Filed at: 11/24/2024 1829   2. How many drinks containing alcohol do you have on a typical day you are drinking?  0 Filed at: 11/24/2024 1829   3a. Male UNDER 65: How often do you have five or more drinks on one occasion? 0 Filed at: 11/24/2024 1829   3b. FEMALE Any Age, or MALE 65+: How often  do you have 4 or more drinks on one occassion? 0 Filed at: 11/24/2024 1829   Audit-C Score 0 Filed at: 11/24/2024 1829   RAY: How many times in the past year have you...    Used an illegal drug or used a prescription medication for non-medical reasons? Never Filed at: 11/24/2024 1829                            History of Present Illness       Chief Complaint   Patient presents with    Headache     Pt states she was at work and a box fell onto her head. Now with head pain and sensitivity to light. Denies LOC.        History reviewed. No pertinent past medical history.   History reviewed. No pertinent surgical history.   History reviewed. No pertinent family history.   Social History     Tobacco Use    Smoking status: Never    Smokeless tobacco: Never   Vaping Use    Vaping status: Never Used   Substance Use Topics    Alcohol use: Never    Drug use: Never      E-Cigarette/Vaping    E-Cigarette Use Never User       E-Cigarette/Vaping Substances      I have reviewed and agree with the history as documented.     HPI  24-year-old female presents with head injury related headache as well as photophobia.  Patient states that the head injury occurred roughly half an hour prior to arrival.  This was at work when a box fell on her head and she has been having headaches since then.  Patient reports no nausea vomiting and also reports no unilateral weakness, numbness.  Patient also reports no vertigo, double vision.  Not on any blood thinners.    Review of Systems   Constitutional:  Negative for chills, diaphoresis, fever and unexpected weight change.   HENT:  Negative for ear pain and sore throat.    Eyes:  Positive for photophobia. Negative for visual disturbance.   Respiratory:  Negative for cough, chest tightness and shortness of breath.    Cardiovascular:  Negative for chest pain and leg swelling.   Gastrointestinal:  Negative for abdominal distention, abdominal pain, constipation, diarrhea, nausea and vomiting.    Endocrine: Negative.    Genitourinary:  Negative for difficulty urinating and dysuria.   Musculoskeletal: Negative.    Skin: Negative.    Allergic/Immunologic: Negative.    Neurological:  Positive for headaches.   Hematological: Negative.    Psychiatric/Behavioral: Negative.     All other systems reviewed and are negative.          Objective       ED Triage Vitals   Temperature Pulse Blood Pressure Respirations SpO2 Patient Position - Orthostatic VS   11/24/24 1814 11/24/24 1814 11/24/24 1814 11/24/24 1814 11/24/24 1814 11/24/24 1814   98.3 °F (36.8 °C) 83 136/81 18 100 % Sitting      Temp Source Heart Rate Source BP Location FiO2 (%) Pain Score    11/24/24 1814 11/24/24 1814 11/24/24 1814 -- 11/24/24 1825    Oral Monitor Left arm  10 - Worst Possible Pain      Vitals      Date and Time Temp Pulse SpO2 Resp BP Pain Score FACES Pain Rating User   11/24/24 1902 -- -- -- -- -- 9 -- KB   11/24/24 1825 -- -- -- -- -- 10 - Worst Possible Pain -- LD   11/24/24 1814 98.3 °F (36.8 °C) 83 100 % 18 136/81 -- -- SB            Physical Exam  Vitals and nursing note reviewed.   Constitutional:       General: She is not in acute distress.     Appearance: Normal appearance. She is not ill-appearing.   HENT:      Head: Normocephalic and atraumatic.      Right Ear: External ear normal.      Left Ear: External ear normal.      Nose: Nose normal.      Mouth/Throat:      Mouth: Mucous membranes are moist.      Pharynx: Oropharynx is clear.   Eyes:      General: No scleral icterus.        Right eye: No discharge.         Left eye: No discharge.      Extraocular Movements: Extraocular movements intact.      Conjunctiva/sclera: Conjunctivae normal.      Pupils: Pupils are equal, round, and reactive to light.   Cardiovascular:      Rate and Rhythm: Normal rate and regular rhythm.      Pulses: Normal pulses.      Heart sounds: Normal heart sounds.   Pulmonary:      Effort: Pulmonary effort is normal.      Breath sounds: Normal breath  sounds.   Abdominal:      General: Abdomen is flat. Bowel sounds are normal. There is no distension.      Palpations: Abdomen is soft.      Tenderness: There is no abdominal tenderness. There is no guarding or rebound.   Musculoskeletal:         General: Normal range of motion.      Cervical back: Normal range of motion and neck supple.   Skin:     General: Skin is warm and dry.      Capillary Refill: Capillary refill takes less than 2 seconds.   Neurological:      General: No focal deficit present.      Mental Status: She is alert and oriented to person, place, and time. Mental status is at baseline.      Cranial Nerves: No cranial nerve deficit.      Sensory: No sensory deficit.      Motor: No weakness.      Gait: Gait normal.   Psychiatric:         Mood and Affect: Mood normal.         Behavior: Behavior normal.         Thought Content: Thought content normal.         Judgment: Judgment normal.         Results Reviewed       None            CT head without contrast   Final Interpretation by Alex Dodson DO (11/24 2123)   No acute intracranial abnormality.                  Workstation performed: [id number]             Procedures    ED Medication and Procedure Management   Prior to Admission Medications   Prescriptions Last Dose Informant Patient Reported? Taking?   ferrous sulfate 325 (65 Fe) mg tablet   No No   Sig: Take 1 tablet (325 mg total) by mouth daily THIS MEDICATION CAN CAUSE YOUR STOOLS TO BECOME BLACK AND CONSTIPATION   olopatadine (PATANOL) 0.1 % ophthalmic solution   No No   Sig: Administer 1 drop to the right eye 2 (two) times a day      Facility-Administered Medications: None     Discharge Medication List as of 11/24/2024  9:30 PM        CONTINUE these medications which have NOT CHANGED    Details   ferrous sulfate 325 (65 Fe) mg tablet Take 1 tablet (325 mg total) by mouth daily THIS MEDICATION CAN CAUSE YOUR STOOLS TO BECOME BLACK AND CONSTIPATION, Starting Sun 10/15/2023, Normal       olopatadine (PATANOL) 0.1 % ophthalmic solution Administer 1 drop to the right eye 2 (two) times a day, Starting Thu 10/10/2024, Normal           No discharge procedures on file.  ED SEPSIS DOCUMENTATION   Time reflects when diagnosis was documented in both MDM as applicable and the Disposition within this note       Time User Action Codes Description Comment    11/24/2024  9:30 PM Ti Anglin Add [S06.0XAA] Concussion                  Ti Anglin MD  11/24/24 7130

## 2024-11-25 NOTE — ED NOTES
Assumed care of patient at this time. Patient in exam room with parent. Stated that she threw up and is feeling nauseous. Provider notified.     Giselle Wilkinson RN  11/24/24 9051

## 2025-01-04 ENCOUNTER — HOSPITAL ENCOUNTER (EMERGENCY)
Facility: HOSPITAL | Age: 25
End: 2025-01-05
Attending: EMERGENCY MEDICINE
Payer: COMMERCIAL

## 2025-01-04 DIAGNOSIS — T14.91XA SUICIDE ATTEMPT (HCC): Primary | ICD-10-CM

## 2025-01-04 LAB
ALBUMIN SERPL BCG-MCNC: 4.1 G/DL (ref 3.5–5)
ALP SERPL-CCNC: 42 U/L (ref 34–104)
ALT SERPL W P-5'-P-CCNC: 8 U/L (ref 7–52)
AMPHETAMINES SERPL QL SCN: NEGATIVE
ANION GAP SERPL CALCULATED.3IONS-SCNC: 11 MMOL/L (ref 4–13)
APAP SERPL-MCNC: <2 UG/ML (ref 10–20)
AST SERPL W P-5'-P-CCNC: 14 U/L (ref 13–39)
BARBITURATES UR QL: NEGATIVE
BASOPHILS # BLD AUTO: 0.01 THOUSANDS/ΜL (ref 0–0.1)
BASOPHILS NFR BLD AUTO: 0 % (ref 0–1)
BENZODIAZ UR QL: NEGATIVE
BILIRUB SERPL-MCNC: 0.77 MG/DL (ref 0.2–1)
BUN SERPL-MCNC: 13 MG/DL (ref 5–25)
CALCIUM SERPL-MCNC: 9.3 MG/DL (ref 8.4–10.2)
CHLORIDE SERPL-SCNC: 102 MMOL/L (ref 96–108)
CO2 SERPL-SCNC: 22 MMOL/L (ref 21–32)
COCAINE UR QL: NEGATIVE
CREAT SERPL-MCNC: 0.85 MG/DL (ref 0.6–1.3)
EOSINOPHIL # BLD AUTO: 0.01 THOUSAND/ΜL (ref 0–0.61)
EOSINOPHIL NFR BLD AUTO: 0 % (ref 0–6)
ERYTHROCYTE [DISTWIDTH] IN BLOOD BY AUTOMATED COUNT: 15.5 % (ref 11.6–15.1)
ETHANOL SERPL-MCNC: <10 MG/DL
EXT PREGNANCY TEST URINE: NEGATIVE
EXT. CONTROL: NORMAL
FENTANYL UR QL SCN: NEGATIVE
GFR SERPL CREATININE-BSD FRML MDRD: 97 ML/MIN/1.73SQ M
GLUCOSE SERPL-MCNC: 105 MG/DL (ref 65–140)
HCT VFR BLD AUTO: 34.1 % (ref 34.8–46.1)
HGB BLD-MCNC: 11.2 G/DL (ref 11.5–15.4)
HYDROCODONE UR QL SCN: NEGATIVE
IMM GRANULOCYTES # BLD AUTO: 0.03 THOUSAND/UL (ref 0–0.2)
IMM GRANULOCYTES NFR BLD AUTO: 0 % (ref 0–2)
LYMPHOCYTES # BLD AUTO: 0.6 THOUSANDS/ΜL (ref 0.6–4.47)
LYMPHOCYTES NFR BLD AUTO: 7 % (ref 14–44)
MCH RBC QN AUTO: 25.1 PG (ref 26.8–34.3)
MCHC RBC AUTO-ENTMCNC: 32.8 G/DL (ref 31.4–37.4)
MCV RBC AUTO: 77 FL (ref 82–98)
METHADONE UR QL: NEGATIVE
MONOCYTES # BLD AUTO: 0.44 THOUSAND/ΜL (ref 0.17–1.22)
MONOCYTES NFR BLD AUTO: 5 % (ref 4–12)
NEUTROPHILS # BLD AUTO: 7.24 THOUSANDS/ΜL (ref 1.85–7.62)
NEUTS SEG NFR BLD AUTO: 88 % (ref 43–75)
NRBC BLD AUTO-RTO: 0 /100 WBCS
OPIATES UR QL SCN: NEGATIVE
OXYCODONE+OXYMORPHONE UR QL SCN: NEGATIVE
PCP UR QL: NEGATIVE
PLATELET # BLD AUTO: 311 THOUSANDS/UL (ref 149–390)
PMV BLD AUTO: 11.1 FL (ref 8.9–12.7)
POTASSIUM SERPL-SCNC: 4 MMOL/L (ref 3.5–5.3)
PROT SERPL-MCNC: 7.8 G/DL (ref 6.4–8.4)
RBC # BLD AUTO: 4.46 MILLION/UL (ref 3.81–5.12)
SALICYLATES SERPL-MCNC: <5 MG/DL (ref 3–20)
SODIUM SERPL-SCNC: 135 MMOL/L (ref 135–147)
THC UR QL: NEGATIVE
WBC # BLD AUTO: 8.33 THOUSAND/UL (ref 4.31–10.16)

## 2025-01-04 PROCEDURE — 85025 COMPLETE CBC W/AUTO DIFF WBC: CPT | Performed by: EMERGENCY MEDICINE

## 2025-01-04 PROCEDURE — 81025 URINE PREGNANCY TEST: CPT | Performed by: EMERGENCY MEDICINE

## 2025-01-04 PROCEDURE — 80307 DRUG TEST PRSMV CHEM ANLYZR: CPT | Performed by: EMERGENCY MEDICINE

## 2025-01-04 PROCEDURE — 99285 EMERGENCY DEPT VISIT HI MDM: CPT | Performed by: EMERGENCY MEDICINE

## 2025-01-04 PROCEDURE — 80143 DRUG ASSAY ACETAMINOPHEN: CPT | Performed by: EMERGENCY MEDICINE

## 2025-01-04 PROCEDURE — 80179 DRUG ASSAY SALICYLATE: CPT | Performed by: EMERGENCY MEDICINE

## 2025-01-04 PROCEDURE — 80053 COMPREHEN METABOLIC PANEL: CPT | Performed by: EMERGENCY MEDICINE

## 2025-01-04 PROCEDURE — 82077 ASSAY SPEC XCP UR&BREATH IA: CPT | Performed by: EMERGENCY MEDICINE

## 2025-01-04 PROCEDURE — 99285 EMERGENCY DEPT VISIT HI MDM: CPT

## 2025-01-04 PROCEDURE — 96374 THER/PROPH/DIAG INJ IV PUSH: CPT

## 2025-01-04 PROCEDURE — 36415 COLL VENOUS BLD VENIPUNCTURE: CPT | Performed by: EMERGENCY MEDICINE

## 2025-01-04 RX ORDER — ONDANSETRON 2 MG/ML
4 INJECTION INTRAMUSCULAR; INTRAVENOUS ONCE
Status: COMPLETED | OUTPATIENT
Start: 2025-01-04 | End: 2025-01-04

## 2025-01-04 RX ADMIN — ONDANSETRON 4 MG: 2 INJECTION INTRAMUSCULAR; INTRAVENOUS at 17:54

## 2025-01-04 NOTE — ED PROCEDURE NOTE
PROCEDURE  ECG 12 Lead Documentation Only    Date/Time: 1/4/2025 4:52 PM    Performed by: Nabor Hester MD  Authorized by: Nabor Hester MD    Indications / Diagnosis:  Overdose  ECG reviewed by me, the ED Provider: yes    Patient location:  ED  Interpretation:     Interpretation: normal    Rate:     ECG rate:  112    ECG rate assessment: tachycardic    Rhythm:     Rhythm: sinus tachycardia    Ectopy:     Ectopy: none    QRS:     QRS axis:  Normal    QRS intervals:  Normal  Conduction:     Conduction: normal    ST segments:     ST segments:  Normal  T waves:     T waves: normal         Nabor Hester MD  01/04/25 9827

## 2025-01-04 NOTE — ED NOTES
Patient is a 24 yr old female, who took an intentional overdose of iron tablets today. This is the first time that she actually overdosed but said that she had thoughts in the past. she reports that she feels very depressed and overwhelmed. 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 She reports that she has been feeling depressed for several months, but never received any treatment. She presents as depressed, not feeling well, poor eye contact, very soft spoken. No reports of psychosis. No prior treatment for depression., though she did think of overdosing recently, she didn't do anything. No hx of SIB. She is very quiet with prior hx. She is aware that she needs to be treated due to the overdose and signed a 201.     Spoke to patient's mother.  She is aware that patient has been depressed and is in agreement with treatment.  She is aware that patient had an argument with her dad, but dosen't know anything else about this.    Patient has no hx of prior treatment.    Anna BROOKS

## 2025-01-04 NOTE — LETTER
[facility] EMERGENCY DEPARTMENT  [address] [address]  [phone]  Dept: [phone]      EMTALA TRANSFER CONSENT    NAME Blanka Salvador                              2000                              MRN [id number]    I have been informed of my rights regarding examination, treatment, and transfer   by Dr. Nabor Hester MD    Benefits: Other benefits (Include comment)_______________________ (Inpt MH tx)    Risks: Potential for delay in receiving treatment      Consent for Transfer:  I acknowledge that my medical condition has been evaluated and explained to me by the emergency department physician or other qualified medical person and/or my attending physician, who has recommended that I be transferred to the service of  Accepting Physician: Dr. Peterson at Accepting Facility Name, City & State : [place]. The above potential benefits of such transfer, the potential risks associated with such transfer, and the probable risks of not being transferred have been explained to me, and I fully understand them.  The doctor has explained that, in my case, the benefits of transfer outweigh the risks.  I agree to be transferred.    I authorize the performance of emergency medical procedures and treatments upon me in both transit and upon arrival at the receiving facility.  Additionally, I authorize the release of any and all medical records to the receiving facility and request they be transported with me, if possible.  I understand that the safest mode of transportation during a medical emergency is an ambulance and that the Hospital advocates the use of this mode of transport. Risks of traveling to the receiving facility by car, including absence of medical control, life sustaining equipment, such as oxygen, and medical personnel has been explained to me and I fully understand them.    (THUY CORRECT BOX BELOW)  [ X ]  I consent to the stated transfer and to  be transported by ambulance/helicopter.  [  ]  I consent to the stated transfer, but refuse transportation by ambulance and accept full responsibility for my transportation by car.  I understand the risks of non-ambulance transfers and I exonerate the Hospital and its staff from any deterioration in my condition that results from this refusal.    X___________________________________________    DATE  25  TIME________  Signature of patient or legally responsible individual signing on patient behalf           RELATIONSHIP TO PATIENT_________________________          Provider Certification    NAME Blanka Salvador                                         2000                              MRN [id number]    A medical screening exam was performed on the above named patient.  Based on the examination:    Condition Necessitating Transfer The encounter diagnosis was Suicide attempt (HCC).    Patient Condition: The patient has been stabilized such that within reasonable medical probability, no material deterioration of the patient condition or the condition of the unborn child(twila) is likely to result from the transfer    Reason for Transfer: Level of Care needed not available at this facility    Transfer Requirements: Facility [facility] [place]   Space available and qualified personnel available for treatment as acknowledged by Naya Naylor LCSW  Agreed to accept transfer and to provide appropriate medical treatment as acknowledged by       Dr. Peetrson  Appropriate medical records of the examination and treatment of the patient are provided at the time of transfer   STAFF INITIAL WHEN COMPLETED _______  Transfer will be performed by qualified personnel from [facility]  and appropriate transfer equipment as required, including the use of necessary and appropriate life support measures.    Provider Certification: I have examined the patient and explained the following risks and benefits of being transferred/refusing  transfer to the patient/family:  General risk, such as traffic hazards, adverse weather conditions, rough terrain or turbulence, possible failure of equipment (including vehicle or aircraft), or consequences of actions of persons outside the control of the transport personnel      Based on these reasonable risks and benefits to the patient and/or the unborn child(twila), and based upon the information available at the time of the patient’s examination, I certify that the medical benefits reasonably to be expected from the provision of appropriate medical treatments at another medical facility outweigh the increasing risks, if any, to the individual’s medical condition, and in the case of labor to the unborn child, from effecting the transfer.    X____________________________________________ DATE 01/04/25        TIME_______      ORIGINAL - SEND TO MEDICAL RECORDS   COPY - SEND WITH PATIENT DURING TRANSFER

## 2025-01-04 NOTE — LETTER
[facility] EMERGENCY DEPARTMENT  [address] [address]  [phone]  Dept: [phone]      EMTALA TRANSFER CONSENT    NAME Blanka Salvador                             2000                              MRN [id number]    I have been informed of my rights regarding examination, treatment, and transfer   by Dr. Nabor Hester MD    Benefits: Other benefits (Include comment)_______________________ (Inpt MH tx)    Risks: Potential for delay in receiving treatment      Consent for Transfer:  I acknowledge that my medical condition has been evaluated and explained to me by the emergency department physician or other qualified medical person and/or my attending physician, who has recommended that I be transferred to the service of  Accepting Physician: Dr. Peterson at Accepting Facility Name, City & State : [place]. The above potential benefits of such transfer, the potential risks associated with such transfer, and the probable risks of not being transferred have been explained to me, and I fully understand them.  The doctor has explained that, in my case, the benefits of transfer outweigh the risks.  I agree to be transferred.    I authorize the performance of emergency medical procedures and treatments upon me in both transit and upon arrival at the receiving facility.  Additionally, I authorize the release of any and all medical records to the receiving facility and request they be transported with me, if possible.  I understand that the safest mode of transportation during a medical emergency is an ambulance and that the Hospital advocates the use of this mode of transport. Risks of traveling to the receiving facility by car, including absence of medical control, life sustaining equipment, such as oxygen, and medical personnel has been explained to me and I fully understand them.    (THUY CORRECT BOX BELOW)  [ X ]  I consent to the stated transfer and to  be transported by ambulance/helicopter.  [  ]  I consent to the stated transfer, but refuse transportation by ambulance and accept full responsibility for my transportation by car.  I understand the risks of non-ambulance transfers and I exonerate the Hospital and its staff from any deterioration in my condition that results from this refusal.    X___________________________________________    DATE  25  TIME________  Signature of patient or legally responsible individual signing on patient behalf           RELATIONSHIP TO PATIENT_________________________                        Provider Certification    NAME Blanka Salvador                              2000                              MRN [id number]    A medical screening exam was performed on the above named patient.  Based on the examination:    Condition Necessitating Transfer The encounter diagnosis was Suicide attempt (HCC).    Patient Condition: The patient has been stabilized such that within reasonable medical probability, no material deterioration of the patient condition or the condition of the unborn child(twila) is likely to result from the transfer    Reason for Transfer: Level of Care needed not available at this facility    Transfer Requirements: Facility [place], [place]   Space available and qualified personnel available for treatment as acknowledged by Naya Naylor LCSW  Agreed to accept transfer and to provide appropriate medical treatment as acknowledged by       Dr. Peterson  Appropriate medical records of the examination and treatment of the patient are provided at the time of transfer   STAFF INITIAL WHEN COMPLETED ___DC____  Transfer will be performed by qualified personnel from [facility]  and appropriate transfer equipment as required, including the use of necessary and appropriate life support measures.    Provider Certification: I have examined the patient and explained the following risks and benefits of being transferred/refusing  transfer to the patient/family:  General risk, such as traffic hazards, adverse weather conditions, rough terrain or turbulence, possible failure of equipment (including vehicle or aircraft), or consequences of actions of persons outside the control of the transport personnel      Based on these reasonable risks and benefits to the patient and/or the unborn child(twila), and based upon the information available at the time of the patient’s examination, I certify that the medical benefits reasonably to be expected from the provision of appropriate medical treatments at another medical facility outweigh the increasing risks, if any, to the individual’s medical condition, and in the case of labor to the unborn child, from effecting the transfer.    X____________________________________________ DATE 01/04/25        TIME_______      ORIGINAL - SEND TO MEDICAL RECORDS   COPY - SEND WITH PATIENT DURING TRANSFER

## 2025-01-04 NOTE — ED PROVIDER NOTES
Time reflects when diagnosis was documented in both MDM as applicable and the Disposition within this note       Time User Action Codes Description Comment    1/4/2025  3:52 PM Nabor Hester Add [T14.91XA] Suicide attempt (HCC)           ED Disposition       ED Disposition   Transfer to Behavioral Health Condition   --    Date/Time   Sat Jan 4, 2025  3:52 PM    Comment   Blanka Salvador should be transferred out to D and has been medically cleared.               Assessment & Plan       Medical Decision Making  Amount and/or Complexity of Data Reviewed  Independent Historian: parent and EMS  Labs: ordered. Decision-making details documented in ED Course.  ECG/medicine tests: ordered and independent interpretation performed. Decision-making details documented in ED Course.    Risk  Prescription drug management.  Decision regarding hospitalization.        ED Course as of 01/04/25 2227   Sat Jan 04, 2025 1717 [facility]   [facility] Patient signed a 201   2226 Signed out to Dr. Hough.       Medications   ondansetron (ZOFRAN) injection 4 mg (4 mg Intravenous Given 1/4/25 1754)       ED Risk Strat Scores                          SBIRT 22yo+      Flowsheet Row Most Recent Value   Initial Alcohol Screen: US AUDIT-C     1. How often do you have a drink containing alcohol? 0 Filed at: 01/04/2025 1725   2. How many drinks containing alcohol do you have on a typical day you are drinking?  0 Filed at: 01/04/2025 1725   3a. Male UNDER 65: How often do you have five or more drinks on one occasion? 0 Filed at: 01/04/2025 1725   3b. FEMALE Any Age, or MALE 65+: How often do you have 4 or more drinks on one occassion? 0 Filed at: 01/04/2025 1725   Audit-C Score 0 Filed at: 01/04/2025 1725   RAY: How many times in the past year have you...    Used an illegal drug or used a prescription medication for non-medical reasons? Never Filed at: 01/04/2025 1725                            History of Present  Illness       Chief Complaint   Patient presents with    Psychiatric Evaluation     Patient arrives with EMS after attempting to OD on iron pills       History reviewed. No pertinent past medical history.   History reviewed. No pertinent surgical history.   History reviewed. No pertinent family history.   Social History     Tobacco Use    Smoking status: Never    Smokeless tobacco: Never   Vaping Use    Vaping status: Never Used   Substance Use Topics    Alcohol use: Never    Drug use: Never      E-Cigarette/Vaping    E-Cigarette Use Never User       E-Cigarette/Vaping Substances      I have reviewed and agree with the history as documented.     Patient is a 24-year-old female brought in by [facility] after an attempted overedose.  Texted friends that she was going to hurt herself.  Found after taking 7 iron supplements just PTA.  Patient denies any other use.  States stressed over school and work.  Mom here at beside.  No known h/o overdose attempt.  H/o depression, but never seen or treated.  Patient denies any HI/hallucinations.          Review of Systems   Constitutional:  Positive for activity change.   HENT: Negative.     Eyes: Negative.    Respiratory: Negative.     Cardiovascular: Negative.    Gastrointestinal: Negative.    Endocrine: Negative.    Genitourinary: Negative.    Musculoskeletal: Negative.    Skin: Negative.    Allergic/Immunologic: Negative.    Neurological: Negative.    Hematological: Negative.    Psychiatric/Behavioral:  Positive for dysphoric mood and suicidal ideas. The patient is nervous/anxious.    All other systems reviewed and are negative.          Objective       ED Triage Vitals [01/04/25 1552]   Temperature Pulse Blood Pressure Respirations SpO2 Patient Position - Orthostatic VS   98.7 °F (37.1 °C) (!) 108 (!) 174/97 20 100 % Sitting      Temp Source Heart Rate Source BP Location FiO2 (%) Pain Score    Tympanic Monitor Left arm -- --      Vitals      Date and Time Temp Pulse SpO2 Resp BP  Pain Score FACES Pain Rating User   01/04/25 1810 -- 104 97 % 20 117/60 -- -- MR   01/04/25 1552 98.7 °F (37.1 °C) 108 100 % 20 174/97 -- -- RG            Physical Exam  Vitals and nursing note reviewed.   Constitutional:       Appearance: Normal appearance.   HENT:      Head: Normocephalic and atraumatic.   Cardiovascular:      Rate and Rhythm: Normal rate and regular rhythm.      Pulses: Normal pulses.      Heart sounds: Normal heart sounds.   Pulmonary:      Effort: Pulmonary effort is normal.      Breath sounds: Normal breath sounds.   Musculoskeletal:         General: Normal range of motion.      Cervical back: Normal range of motion and neck supple.   Skin:     General: Skin is warm and dry.      Capillary Refill: Capillary refill takes less than 2 seconds.   Neurological:      General: No focal deficit present.      Mental Status: She is alert and oriented to person, place, and time.      Motor: No weakness.   Psychiatric:         Attention and Perception: She is inattentive.         Mood and Affect: Mood is depressed. Affect is tearful.         Speech: Speech normal.         Behavior: Behavior is withdrawn.         Thought Content: Thought content includes suicidal ideation. Thought content includes suicidal plan.         Judgment: Judgment is impulsive and inappropriate.         Results Reviewed       Procedure Component Value Units Date/Time    Rapid drug screen, urine [[id number]]  (Normal) Collected: 01/04/25 2009    Lab Status: Final result Specimen: Urine, Clean Catch Updated: 01/04/25 2042     Amph/Meth UR Negative     Barbiturate Ur Negative     Benzodiazepine Urine Negative     Cocaine Urine Negative     Methadone Urine Negative     Opiate Urine Negative     PCP Ur Negative     THC Urine Negative     Oxycodone Urine Negative     Fentanyl Urine Negative     HYDROCODONE URINE Negative    Narrative:      FOR MEDICAL PURPOSES ONLY.   IF CONFIRMATION NEEDED PLEASE CONTACT THE LAB WITHIN 5 DAYS.    Drug  Screen Cutoff Levels:  AMPHETAMINE/METHAMPHETAMINES  1000 ng/mL  BARBITURATES     200 ng/mL  BENZODIAZEPINES     200 ng/mL  COCAINE      300 ng/mL  METHADONE      300 ng/mL  OPIATES      300 ng/mL  PHENCYCLIDINE     25 ng/mL  THC       50 ng/mL  OXYCODONE      100 ng/mL  FENTANYL      5 ng/mL  HYDROCODONE     300 ng/mL    POCT pregnancy, urine [[id number]]  (Normal) Collected: 01/04/25 2011    Lab Status: Final result Updated: 01/04/25 2011     EXT Preg Test, Ur Negative     Control Valid    Ethanol [[id number]]  (Normal) Collected: 01/04/25 1607    Lab Status: Final result Specimen: Blood from Arm, Right Updated: 01/04/25 1637     Ethanol Lvl <10 mg/dL     Comprehensive metabolic panel [[id number]] Collected: 01/04/25 1607    Lab Status: Final result Specimen: Blood from Arm, Right Updated: 01/04/25 1634     Sodium 135 mmol/L      Potassium 4.0 mmol/L      Chloride 102 mmol/L      CO2 22 mmol/L      ANION GAP 11 mmol/L      BUN 13 mg/dL      Creatinine 0.85 mg/dL      Glucose 105 mg/dL      Calcium 9.3 mg/dL      AST 14 U/L      ALT 8 U/L      Alkaline Phosphatase 42 U/L      Total Protein 7.8 g/dL      Albumin 4.1 g/dL      Total Bilirubin 0.77 mg/dL      eGFR 97 ml/min/1.73sq m     Narrative:      National Kidney Disease Foundation guidelines for Chronic Kidney Disease (CKD):     Stage 1 with normal or high GFR (GFR > 90 mL/min/1.73 square meters)    Stage 2 Mild CKD (GFR = 60-89 mL/min/1.73 square meters)    Stage 3A Moderate CKD (GFR = 45-59 mL/min/1.73 square meters)    Stage 3B Moderate CKD (GFR = 30-44 mL/min/1.73 square meters)    Stage 4 Severe CKD (GFR = 15-29 mL/min/1.73 square meters)    Stage 5 End Stage CKD (GFR <15 mL/min/1.73 square meters)  Note: GFR calculation is accurate only with a steady state creatinine    Salicylate level [[id number]]  (Normal) Collected: 01/04/25 1607    Lab Status: Final result Specimen: Blood from Arm, Right Updated: 01/04/25 1634     Salicylate Lvl <5 mg/dL     Acetaminophen  level-If concentration is detectable, please discuss with medical  on call. [[id number]]  (Abnormal) Collected: 01/04/25 1607    Lab Status: Final result Specimen: Blood from Arm, Right Updated: 01/04/25 1634     Acetaminophen Level <2 ug/mL     CBC and differential [[id number]]  (Abnormal) Collected: 01/04/25 1607    Lab Status: Final result Specimen: Blood from Arm, Right Updated: 01/04/25 1619     WBC 8.33 Thousand/uL      RBC 4.46 Million/uL      Hemoglobin 11.2 g/dL      Hematocrit 34.1 %      MCV 77 fL      MCH 25.1 pg      MCHC 32.8 g/dL      RDW 15.5 %      MPV 11.1 fL      Platelets 311 Thousands/uL      nRBC 0 /100 WBCs      Segmented % 88 %      Immature Grans % 0 %      Lymphocytes % 7 %      Monocytes % 5 %      Eosinophils Relative 0 %      Basophils Relative 0 %      Absolute Neutrophils 7.24 Thousands/µL      Absolute Immature Grans 0.03 Thousand/uL      Absolute Lymphocytes 0.60 Thousands/µL      Absolute Monocytes 0.44 Thousand/µL      Eosinophils Absolute 0.01 Thousand/µL      Basophils Absolute 0.01 Thousands/µL             No orders to display       Procedures    ED Medication and Procedure Management   Prior to Admission Medications   Prescriptions Last Dose Informant Patient Reported? Taking?   ferrous sulfate 325 (65 Fe) mg tablet   No No   Sig: Take 1 tablet (325 mg total) by mouth daily THIS MEDICATION CAN CAUSE YOUR STOOLS TO BECOME BLACK AND CONSTIPATION   olopatadine (PATANOL) 0.1 % ophthalmic solution   No No   Sig: Administer 1 drop to the right eye 2 (two) times a day      Facility-Administered Medications: None     Patient's Medications   Discharge Prescriptions    No medications on file     No discharge procedures on file.  ED SEPSIS DOCUMENTATION   Time reflects when diagnosis was documented in both MDM as applicable and the Disposition within this note       Time User Action Codes Description Comment    1/4/2025  3:52 PM Nabor Hester Add [T14.91XA] Suicide attempt  ([facility])                  Nabor Hester MD  01/04/25 7162

## 2025-01-05 VITALS
SYSTOLIC BLOOD PRESSURE: 125 MMHG | TEMPERATURE: 98 F | OXYGEN SATURATION: 99 % | HEART RATE: 82 BPM | DIASTOLIC BLOOD PRESSURE: 68 MMHG | RESPIRATION RATE: 16 BRPM

## 2025-01-05 NOTE — ED CARE HANDOFF
Emergency Department Sign Out Note        Sign out and transfer of care from Dr Parr. See Separate Emergency Department note.     The patient, Blanka Salvador, was evaluated by the previous provider for od suicide attempt.    Workup Completed:  As above    ED Course / Workup Pending (followup):  201                                     Procedures  Medical Decision Making  Amount and/or Complexity of Data Reviewed  Labs: ordered.    Risk  Prescription drug management.  Decision regarding hospitalization.            Disposition  Final diagnoses:   Suicide attempt (HCC)     Time reflects when diagnosis was documented in both MDM as applicable and the Disposition within this note       Time User Action Codes Description Comment    1/4/2025  3:52 PM Nabor Hester Add [T14.91XA] Suicide attempt (HCC)           ED Disposition       ED Disposition   Transfer to Behavioral Health Condition   --    Date/Time   Sat Jan 4, 2025  3:52 PM    Comment   Blanka Salvador should be transferred out to D and has been medically cleared.               MD Documentation      Flowsheet Row Most Recent Value   Patient Condition The patient has been stabilized such that within reasonable medical probability, no material deterioration of the patient condition or the condition of the unborn child(twila) is likely to result from the transfer   Reason for Transfer Level of Care needed not available at this facility   Benefits of Transfer Other benefits (Include comment)_______________________  [Inpt MH tx]   Risks of Transfer Potential for delay in receiving treatment   Accepting Physician Dr. Peterson   Accepting Facility Name, [facility]  SHERI [facility]    (Name & Tel number) Naya Naylor LCSW   Transported by (Company and Unit #) [employer]   Sending MD Dr. Hester   Provider Certification General risk, such as traffic hazards, adverse weather conditions, rough terrain or turbulence, possible failure of equipment  (including vehicle or aircraft), or consequences of actions of persons outside the control of the transport personnel          RN Documentation      Flowsheet Row Most Recent Value   Accepting Facility Name, City & State  [place]  SHEIR [facility]    (Name & Tel number) Naya Naylor LCSW   Transported by (Company and Unit #) [employer]   Patient Belongings Disposition Sent with patient          Follow-up Information    None       Patient's Medications   Discharge Prescriptions    No medications on file     No discharge procedures on file.       ED Provider  Electronically Signed by     Robert Hough MD  01/04/25 3763

## 2025-01-05 NOTE — ED NOTES
Pt appears to be sleeping, no visible signs of distress, respirations visualized. Psych assessment and vital signs not completed as rest is more beneficial to the patient at this time. 1:1 continues.      Giselle Wilkinson RN  01/05/25 0641

## 2025-01-05 NOTE — ED NOTES
6567 - CW received call from Admissions at [facility] requesting copy of UDS results. CW sent via fax.    ANSHUL, JAYASHREE

## 2025-01-05 NOTE — ED NOTES
Transportation scheduled with [employer] for 11a. [place] provided ETA. No nurse report needed. [place] will complete pre-cert.     Naya Naylor, ELIZA  01/04/25    7422

## 2025-01-05 NOTE — ED NOTES
Patient is accepted at [place]  Patient is accepted by Dr. Nicholas Pretty     Transportation is arranged with [employer].     Transportation is scheduled for 11am   Patient may go to the floor at 11am

## 2025-01-05 NOTE — ED NOTES
Pt appears to be sleeping, no visible signs of distress, respirations visualized. Psych assessment and vital signs not completed as rest is more beneficial to the patient at this time. 1:1 continues.      Giselle Wilkinson RN  01/04/25 1510

## 2025-01-05 NOTE — ED NOTES
Patient awake and cooperative. Patient requests water and TV remote, same provided. 1:1 observation continues at bedside.      Jacinda Kolb RN  01/05/25 2527

## 2025-01-05 NOTE — ED NOTES
Pt given phone after discussion of phone etiquette and rules. Pt agreeable to rules, and expressed understanding that phone privileges will be revoked for 24 hours if phone rules are broken.      Giselle Wilkinson RN  01/04/25 2004

## 2025-01-05 NOTE — ED NOTES
Per EVS, patient is not on file with [facility]. Patient has active coverage with [facility] verified via [employer].     Naya Naylor, MERVATW  01/04/25 2042

## 2025-03-05 ENCOUNTER — HOSPITAL ENCOUNTER (EMERGENCY)
Facility: HOSPITAL | Age: 25
Discharge: HOME | End: 2025-03-05
Attending: EMERGENCY MEDICINE
Payer: COMMERCIAL

## 2025-03-05 VITALS
WEIGHT: 122.58 LBS | BODY MASS INDEX: 19.2 KG/M2 | OXYGEN SATURATION: 98 % | TEMPERATURE: 98.3 F | DIASTOLIC BLOOD PRESSURE: 68 MMHG | SYSTOLIC BLOOD PRESSURE: 123 MMHG | RESPIRATION RATE: 18 BRPM | HEART RATE: 88 BPM

## 2025-03-05 DIAGNOSIS — Z02.89 ENCOUNTER TO OBTAIN EXCUSE FROM WORK: Primary | ICD-10-CM

## 2025-03-05 PROCEDURE — 99281 EMR DPT VST MAYX REQ PHY/QHP: CPT

## 2025-03-05 PROCEDURE — 99283 EMERGENCY DEPT VISIT LOW MDM: CPT

## 2025-03-05 NOTE — Clinical Note
Blanka Salvador was seen and treated in our emergency department on 3/5/2025.    No restrictions            Diagnosis:     Blanka  may return to school on return date.    She may return on this date: 03/06/2025    Please excuse 03/05 as she was seen and evaluated in the ED.      If you have any questions or concerns, please don't hesitate to call.      REEMA Goyal    ______________________________           _______________          _______________  Hospital Representative                              Date                                Time

## 2025-03-05 NOTE — DISCHARGE INSTRUCTIONS
Continue to use Tylenol or Motrin if you develop a fever. Follow-up with PCP and return to ED for any worsening symptoms.

## 2025-03-05 NOTE — ED PROVIDER NOTES
Time reflects when diagnosis was documented in both MDM as applicable and the Disposition within this note       Time User Action Codes Description Comment    3/5/2025  3:11 PM Veronica Montaño Add [Z02.89] Encounter to obtain excuse from work           ED Disposition       ED Disposition   Discharge    Condition   Stable    Date/Time   Wed Mar 5, 2025  3:11 PM    Comment   Jordonestephania [place] discharge to home/self care.                   Assessment & Plan       Medical Decision Making  Patient is a 24-year-old female presenting for a school note. Upon examination, patient is well-appearing and does not appear in acute distress. Vital signs are WNL and patient is afebrile. Normal heart and lung sounds. Patient offered COVID/flu swab but has declined. School note provided. Advised that she may continue to use Tylenol and Motrin as needed if fever returns. Follow-up with PCP and return to ED for any worsening symptoms. Patient verbalizes understanding of discharge instructions and follow-up care at this time.              Medications - No data to display    ED Risk Strat Scores                                                History of Present Illness       Chief Complaint   Patient presents with    Letter for School/Work     Needs note for school; was seen by campus RN who said she had fever and was sent home.        History reviewed. No pertinent past medical history.   History reviewed. No pertinent surgical history.   History reviewed. No pertinent family history.   Social History     Tobacco Use    Smoking status: Never    Smokeless tobacco: Never   Vaping Use    Vaping status: Never Used   Substance Use Topics    Alcohol use: Never    Drug use: Never      E-Cigarette/Vaping    E-Cigarette Use Never User       E-Cigarette/Vaping Substances      I have reviewed and agree with the history as documented.     Patient is a 24-year-old female with no significant past medical history. Presents today for a return to school  note. Patient states that she was evaluated by the school nurse today for a fever of 100.7 Tylenol was given as well as some ginger ale. She reports feeling better but needs a doctor's note as she had missed an exam today. She denies any fever or chills at this time. No chest pain, shortness of breath, or abdominal pain.           Review of Systems   Constitutional:  Negative for chills and fever.   HENT:  Negative for congestion and rhinorrhea.    Respiratory:  Negative for shortness of breath.    Cardiovascular:  Negative for chest pain.   Gastrointestinal:  Negative for abdominal pain, diarrhea, nausea and vomiting.   All other systems reviewed and are negative.          Objective       ED Triage Vitals [03/05/25 1459]   Temperature Pulse Blood Pressure Respirations SpO2 Patient Position - Orthostatic VS   98.3 °F (36.8 °C) 88 123/68 18 98 % Sitting      Temp Source Heart Rate Source BP Location FiO2 (%) Pain Score    Oral Monitor Left arm -- --      Vitals      Date and Time Temp Pulse SpO2 Resp BP Pain Score FACES Pain Rating User   03/05/25 1459 98.3 °F (36.8 °C) 88 98 % 18 123/68 -- -- JA            Physical Exam  Vitals and nursing note reviewed.   Constitutional:       Appearance: Normal appearance.   Cardiovascular:      Rate and Rhythm: Normal rate.      Heart sounds: Normal heart sounds.   Pulmonary:      Effort: Pulmonary effort is normal.      Breath sounds: Normal breath sounds.   Skin:     General: Skin is warm and dry.      Capillary Refill: Capillary refill takes less than 2 seconds.   Neurological:      General: No focal deficit present.      Mental Status: She is alert and oriented to person, place, and time.   Psychiatric:         Mood and Affect: Mood normal.         Behavior: Behavior normal.         Results Reviewed       None            No orders to display       Procedures    ED Medication and Procedure Management   Prior to Admission Medications   Prescriptions Last Dose Informant Patient  Reported? Taking?   ferrous sulfate 325 (65 Fe) mg tablet   No No   Sig: Take 1 tablet (325 mg total) by mouth daily THIS MEDICATION CAN CAUSE YOUR STOOLS TO BECOME BLACK AND CONSTIPATION   olopatadine (PATANOL) 0.1 % ophthalmic solution   No No   Sig: Administer 1 drop to the right eye 2 (two) times a day      Facility-Administered Medications: None     Discharge Medication List as of 3/5/2025  3:14 PM        CONTINUE these medications which have NOT CHANGED    Details   ferrous sulfate 325 (65 Fe) mg tablet Take 1 tablet (325 mg total) by mouth daily THIS MEDICATION CAN CAUSE YOUR STOOLS TO BECOME BLACK AND CONSTIPATION, Starting Sun 10/15/2023, Normal      olopatadine (PATANOL) 0.1 % ophthalmic solution Administer 1 drop to the right eye 2 (two) times a day, Starting Thu 10/10/2024, Normal           No discharge procedures on file.  ED SEPSIS DOCUMENTATION   Time reflects when diagnosis was documented in both MDM as applicable and the Disposition within this note       Time User Action Codes Description Comment    3/5/2025  3:11 PM Veronica Montaño Add [Z02.89] Encounter to obtain excuse from work                  REEMA Goyal  03/05/25 8704

## 2025-05-03 ENCOUNTER — HOSPITAL ENCOUNTER (EMERGENCY)
Facility: HOSPITAL | Age: 25
Discharge: HOME | End: 2025-05-03

## 2025-05-03 VITALS
RESPIRATION RATE: 18 BRPM | TEMPERATURE: 98.5 F | HEART RATE: 90 BPM | DIASTOLIC BLOOD PRESSURE: 67 MMHG | SYSTOLIC BLOOD PRESSURE: 119 MMHG | OXYGEN SATURATION: 100 %

## 2025-05-03 DIAGNOSIS — F10.929 ALCOHOL INTOXICATION (HCC): Primary | ICD-10-CM

## 2025-05-03 DIAGNOSIS — R11.10 VOMITING: ICD-10-CM

## 2025-05-03 PROCEDURE — 99284 EMERGENCY DEPT VISIT MOD MDM: CPT

## 2025-05-03 PROCEDURE — 99283 EMERGENCY DEPT VISIT LOW MDM: CPT

## 2025-05-03 PROCEDURE — 96361 HYDRATE IV INFUSION ADD-ON: CPT

## 2025-05-03 PROCEDURE — 96360 HYDRATION IV INFUSION INIT: CPT

## 2025-05-03 RX ORDER — ONDANSETRON 2 MG/ML
1 INJECTION INTRAMUSCULAR; INTRAVENOUS ONCE
Status: COMPLETED | OUTPATIENT
Start: 2025-05-03 | End: 2025-05-03

## 2025-05-03 RX ADMIN — SODIUM CHLORIDE 1000 ML: 0.9 INJECTION, SOLUTION INTRAVENOUS at 02:47

## 2025-05-03 NOTE — ED PROVIDER NOTES
Time reflects when diagnosis was documented in both MDM as applicable and the Disposition within this note       Time User Action Codes Description Comment    5/3/2025  7:34 AM Sri Grayson Add [F10.929] Alcohol intoxication (HCC)     5/3/2025  7:34 AM Sri Grayson Add [R11.10] Vomiting           ED Disposition       ED Disposition   Discharge    Condition   Stable    Date/Time   Sat May 3, 2025  7:34 AM    Comment   Blanka Salvador discharge to home/self care.                   Assessment & Plan         Medical Decision Making  Patient presents via EMS from a local club due to alcohol intoxication.  No other known recreational substance abuse per bystanders who were with the patient tonight.  On arrival she is sleeping and unable to answer the orientation questions, but is hemodynamically stable and tolerating her secretions without difficulty.  Differential diagnosis includes but is not limited to alcohol intoxication, polysubstance intoxication, or hypoglycemia; doubt acute intracranial abnormality.  There is no external evidence of trauma and the patient's friend deny any injury prior to arrival, therefore low suspicion for acute intracranial pathology such as SAH or ICH.  Will not obtain head CT at this time.  Fingerstick glucose for EMS was 198.  Patient was monitored in the department for approximately 5 hours and remained hemodynamically stable throughout.  Once the patient awoke she was alert and oriented with intact coordination, a steady unassisted gait, and clear speech.  At this time she is clinically sober and will be discharged with a sober ride.  Return precautions discussed.        ED Course as of 05/03/25 0803   Sat May 03, 2025   0750 Patient awoke and is questioning where she was.  She does recall being out with friends celebrating her birthday last night and drinking, but she does not remember being brought from the club to the hospital.  She denies any recreational drug use.  At this time  she A&O x 3, with intact coordination, a steady unassisted gait, and clear speech.  She is clinically sober at this time and will be discharged with a sober ride.       Medications   ondansetron (FOR EMS ONLY) (ZOFRAN) 4 mg/2 mL injection 4 mg (0 mg Does not apply Given to EMS 5/3/25 5532)   sodium chloride 0.9 % bolus 1,000 mL (0 mL Intravenous Stopped 5/3/25 5314)       ED Risk Strat Scores          History of Present Illness       Chief Complaint   Patient presents with    Alcohol Intoxication     Pt arrives via EMS from after hours club       No past medical history on file.   No past surgical history on file.   No family history on file.   Social History     Tobacco Use    Smoking status: Never    Smokeless tobacco: Never   Vaping Use    Vaping status: Never Used   Substance Use Topics    Alcohol use: Never    Drug use: Never      E-Cigarette/Vaping    E-Cigarette Use Never User       E-Cigarette/Vaping Substances      I have reviewed and agree with the history as documented.     The patient is a 25-year-old female with no pertinent past medical history, who presents via EMS due to alcohol intoxication.  History was initially obtained by the paramedics and her friend who accompanied her to the hospital.  Per the patient's friend they were out celebrating the patient's birthday and she drink approximately 1/3 bottle of tequila.  Prior to arrival patient was unsteady on her feet, leaning on a trash can, and was covered in vomit.  Per the patient's friend the patient did not use any illicit substances last night.  Blood sugar was 198 per EMS.          Review of Systems   Unable to perform ROS: Mental status change       Objective       ED Triage Vitals   Temperature Pulse Blood Pressure Respirations SpO2 Patient Position - Orthostatic VS   05/03/25 0736 05/03/25 0237 05/03/25 0237 05/03/25 0237 05/03/25 0237 05/03/25 0601   98.5 °F (36.9 °C) 84 103/55 16 100 % Lying      Temp Source Heart Rate Source BP Location  FiO2 (%) Pain Score    05/03/25 0736 05/03/25 0601 05/03/25 0601 -- --    Oral Monitor Left arm        Vitals      Date and Time Temp Pulse SpO2 Resp BP Pain Score FACES Pain Rating User   05/03/25 0736 98.5 °F (36.9 °C) -- -- -- -- -- -- EC   05/03/25 0601 -- 90 100 % 18 119/67 -- -- KB   05/03/25 0237 -- 84 100 % 16 103/55 -- --             Physical Exam  Vitals and nursing note reviewed.   Constitutional:       General: She is sleeping.      Appearance: She is well-developed, well-groomed and normal weight. She is not toxic-appearing or diaphoretic.   HENT:      Head: Normocephalic and atraumatic. No raccoon eyes, La's sign, abrasion, contusion or laceration.      Comments: No external signs of trauma.     Right Ear: External ear normal. No hemotympanum.      Left Ear: External ear normal. No hemotympanum.      Nose: Nose normal.      Right Nostril: No epistaxis or septal hematoma.      Left Nostril: No epistaxis or septal hematoma.      Mouth/Throat:      Lips: Pink.      Mouth: Mucous membranes are moist.      Comments: Patient is tolerating her secretions without difficulty.  Eyes:      General: Lids are normal. Gaze aligned appropriately.      Conjunctiva/sclera: Conjunctivae normal.      Pupils: Pupils are equal, round, and reactive to light.      Right eye: Pupil is sluggish. Pupil is round and reactive.      Left eye: Pupil is sluggish. Pupil is round and reactive.   Cardiovascular:      Rate and Rhythm: Normal rate and regular rhythm.      Heart sounds: S1 normal and S2 normal. No murmur heard.     No friction rub. No gallop.   Pulmonary:      Effort: Pulmonary effort is normal. No respiratory distress.      Breath sounds: Normal breath sounds and air entry. No stridor or transmitted upper airway sounds. No wheezing, rhonchi or rales.   Abdominal:      General: Abdomen is flat.      Palpations: Abdomen is soft.   Musculoskeletal:      Cervical back: Normal, full passive range of motion without pain  and neck supple. No rigidity or crepitus. No spinous process tenderness or muscular tenderness.      Thoracic back: Normal.      Lumbar back: Normal.   Lymphadenopathy:      Cervical: No cervical adenopathy.   Skin:     General: Skin is warm.      Capillary Refill: Capillary refill takes less than 2 seconds.      Coloration: Skin is not pale.      Findings: No abrasion, bruising, ecchymosis, signs of injury, petechiae, rash or wound.   Neurological:      Comments: Patient is somnolent and unable to answer the orientation questions at this time.   Psychiatric:         Attention and Perception: Attention normal.         Mood and Affect: Mood normal.         Speech: Speech normal.         Behavior: Behavior normal.         Results Reviewed       None            No orders to display       Procedures      ED Medication and Procedure Management   Prior to Admission Medications   Prescriptions Last Dose Informant Patient Reported? Taking?   ferrous sulfate 325 (65 Fe) mg tablet   No No   Sig: Take 1 tablet (325 mg total) by mouth daily THIS MEDICATION CAN CAUSE YOUR STOOLS TO BECOME BLACK AND CONSTIPATION   olopatadine (PATANOL) 0.1 % ophthalmic solution   No No   Sig: Administer 1 drop to the right eye 2 (two) times a day      Facility-Administered Medications: None     Patient's Medications   Discharge Prescriptions    No medications on file     No discharge procedures on file.  ED SEPSIS DOCUMENTATION   Time reflects when diagnosis was documented in both MDM as applicable and the Disposition within this note       Time User Action Codes Description Comment    5/3/2025  7:34 AM Sri Grayson [F10.929] Alcohol intoxication (HCC)     5/3/2025  7:34 AM Sri Grayson [R11.10] Vomiting                  Sri Grayson PA-C  05/03/25 0803

## 2025-05-03 NOTE — ED NOTES
Patient's friend dropped off purse and gave name and number for patient to contact when she wakes up. Yasmine Dobbs [phone]     Giselle Wilkinson RN  05/03/25 0252